# Patient Record
Sex: FEMALE | Race: WHITE | Employment: OTHER | URBAN - METROPOLITAN AREA
[De-identification: names, ages, dates, MRNs, and addresses within clinical notes are randomized per-mention and may not be internally consistent; named-entity substitution may affect disease eponyms.]

---

## 2017-06-01 ENCOUNTER — HOSPITAL ENCOUNTER (OUTPATIENT)
Dept: OTHER | Age: 77
Discharge: HOME | End: 2017-06-01
Attending: INTERNAL MEDICINE | Admitting: INTERNAL MEDICINE

## 2017-06-01 DIAGNOSIS — M54.9 BACK PAIN, UNSPECIFIED BACK LOCATION, UNSPECIFIED BACK PAIN LATERALITY, UNSPECIFIED CHRONICITY: ICD-10-CM

## 2017-09-11 ENCOUNTER — HOSPITAL ENCOUNTER (OUTPATIENT)
Dept: OTHER | Age: 77
Discharge: HOME | End: 2017-09-11
Attending: INTERNAL MEDICINE | Admitting: INTERNAL MEDICINE

## 2017-09-11 DIAGNOSIS — R19.7 DIARRHEA, UNSPECIFIED TYPE: ICD-10-CM

## 2017-09-11 LAB
A/G RATIO: 1.2 (ref 1.1–2.2)
ALBUMIN SERPL-MCNC: 3.8 G/DL (ref 3.4–5)
ALP BLD-CCNC: 94 U/L (ref 40–129)
ALT SERPL-CCNC: 18 U/L (ref 10–40)
ANION GAP SERPL CALCULATED.3IONS-SCNC: 16 MMOL/L (ref 3–16)
AST SERPL-CCNC: 20 U/L (ref 15–37)
BILIRUB SERPL-MCNC: 0.3 MG/DL (ref 0–1)
BUN BLDV-MCNC: 10 MG/DL (ref 7–20)
C DIFFICILE TOXIN, EIA: NORMAL
CALCIUM SERPL-MCNC: 9.9 MG/DL (ref 8.3–10.6)
CHLORIDE BLD-SCNC: 99 MMOL/L (ref 99–110)
CO2: 24 MMOL/L (ref 21–32)
CREAT SERPL-MCNC: 0.8 MG/DL (ref 0.6–1.2)
GFR AFRICAN AMERICAN: >60
GFR NON-AFRICAN AMERICAN: >60
GLOBULIN: 3.1 G/DL
GLUCOSE BLD-MCNC: 124 MG/DL (ref 70–99)
HCT VFR BLD CALC: 44.7 % (ref 36–48)
HEMOGLOBIN: 15.1 G/DL (ref 12–16)
MCH RBC QN AUTO: 31.7 PG (ref 26–34)
MCHC RBC AUTO-ENTMCNC: 33.8 G/DL (ref 31–36)
MCV RBC AUTO: 93.9 FL (ref 80–100)
PDW BLD-RTO: 13.4 % (ref 12.4–15.4)
PLATELET # BLD: 245 K/UL (ref 135–450)
PMV BLD AUTO: 8.7 FL (ref 5–10.5)
POTASSIUM SERPL-SCNC: 4.1 MMOL/L (ref 3.5–5.1)
RBC # BLD: 4.76 M/UL (ref 4–5.2)
SODIUM BLD-SCNC: 139 MMOL/L (ref 136–145)
TOTAL PROTEIN: 6.9 G/DL (ref 6.4–8.2)
WBC # BLD: 13.7 K/UL (ref 4–11)
WHITE BLOOD CELLS (WBC), STOOL: NORMAL

## 2017-09-12 LAB — GI BACTERIAL PATHOGENS BY PCR: NORMAL

## 2017-09-14 LAB
OVA AND PARASITE TRICHROME: NEGATIVE
OVA AND PARASITE WET MOUNT: NEGATIVE

## 2018-06-13 ENCOUNTER — OFFICE VISIT (OUTPATIENT)
Dept: PULMONOLOGY | Age: 78
End: 2018-06-13

## 2018-06-13 VITALS
DIASTOLIC BLOOD PRESSURE: 63 MMHG | TEMPERATURE: 97.8 F | BODY MASS INDEX: 33.38 KG/M2 | WEIGHT: 188.4 LBS | RESPIRATION RATE: 16 BRPM | HEART RATE: 72 BPM | HEIGHT: 63 IN | OXYGEN SATURATION: 94 % | SYSTOLIC BLOOD PRESSURE: 124 MMHG

## 2018-06-13 DIAGNOSIS — Z72.0 TOBACCO USE: ICD-10-CM

## 2018-06-13 DIAGNOSIS — R06.02 SOB (SHORTNESS OF BREATH): Primary | ICD-10-CM

## 2018-06-13 PROCEDURE — 99205 OFFICE O/P NEW HI 60 MIN: CPT | Performed by: INTERNAL MEDICINE

## 2018-06-14 ENCOUNTER — HOSPITAL ENCOUNTER (OUTPATIENT)
Dept: OTHER | Age: 78
Discharge: HOME | End: 2018-06-30
Attending: INTERNAL MEDICINE | Admitting: INTERNAL MEDICINE

## 2018-06-14 ASSESSMENT — PAIN SCALES - QUEBEC BACK PAIN DISABILITY SCALE
SIT IN A CHAIR FOR SEVERAL HOURS: 2
RIDE IN A CAR: 1
CLIMB ONE FLIGHT OF STAIRS: 2
SLEEP THROUGH THE NIGHT: 5
BEND OVER TO CLEAN THE BATHTUB: 2
WALK A FEW BLOCKS OR 300 TO 400M: 5
PUT ON SOCKS OR PANYHOSE: 0
RUN ONE BLOCK OR 100M: 5
TURN OVER IN BED: 3
WALK SEVERAL KILOMETERS  OR MILES: 5
MAKE YOUR BED: 0
STAND UP FOR 20 TO 30 MINUTES: 5
PULL OR PUSH HEAVY DOORS: 1
TOTAL SCORE: 42
QUEBEC DISABILITY INDEX: 40-59%
LIFT AND CARRY A HEAVY SUITCASE: 3
MOVE A CHAIR: 0
CARRY TWO BAGS OF GROCERIES: 1
TAKE FOOD OUT OF THE REFRIGERATOR: 0
THROW A BALL: 0
REACH UP TO HIGH SHELVES: 0
QUEBEC CMS MODIFIER: CK
GET OUT OF BED: 2

## 2018-06-14 NOTE — FLOWSHEET NOTE
with R THR   DKC  x 10 sec     LTR X 10     PPT  TA X 10   add Min cues    pirif   Gentle with R THR    add set  GS     HL TB abd               MHP with Premods 2 large pads B LB seated x 15 min       Other Therapeutic Activities:  Pt was educated on PT POC, Diagnosis, Prognosis, pathomechanics as well as frequency and duration of scheduling future physical therapy appointments. Time was also taken on this day to answer all patient questions and participation in PT. Reviewed appointment policy in detail with patient and patient verbalized understanding. ADL booklet issued and reviewed. Home Exercise Program:  Patient instructed in the above for HEP:   Patient verbalized/demonstrated understanding and was issued written handout.     Timed Code Treatment Minutes:  30    Total Treatment Minutes:  60    Treatment/Activity Tolerance:  [x] Patient tolerated treatment well [] Patient limited by fatigue  [] Patient limited by pain  [] Patient limited by other medical complications  [] Other:     Prognosis: [x] Good [] Fair  [] Poor    Goals:    Short term goals  Time Frame for Short term goals: 2 wks   Short term goal 1: pain improvement 20-30% overall per pt   Short term goal 2: B LE flex WFL      Long term goals  Time Frame for Long term goals : 4 wks  Long term goal 1: pain improvement 40-50% improvement overall per pt for ease with stand/amb justo   Long term goal 2: lumbar ROM WFL min-no c/o for ease with dressing/general mobility   Long term goal 3: pt independent with hep for core stretching/strengthening for ease with light housework      Patient Requires Follow-up: [x] Yes  [] No    Plan:   [] Continue per plan of care [] Alter current plan (see comments)  [x] Plan of care initiated [] Hold pending MD visit [] Discharge    Plan for Next Session:  Add above as stated    Electronically signed by:  Kamini Smith, [id number] [address]

## 2018-06-14 NOTE — PLAN OF CARE
Outpatient Physical Therapy  [x] [facility]    Phone: [phone]   Fax: [phone]   [] [facility]  Phone: [phone]              Fax: [phone]  [] [facility]   Phone: [phone]   Fax: [phone]     To: Referring Practitioner: Dr. Maryann Choe       Patient: Seda Miguel   : 1940   MRN: [id number]  Evaluation Date: 2018      Diagnosis Information:  · Diagnosis: pain in joint multiple   · Treatment Diagnosis: low back pain, dec ROM, weakness     Physical Therapy Certification/Re-Certification Form  Dear Dr. Maryann Choe,   The following patient has been evaluated for physical therapy services and for therapy to continue, Medicare requires monthly physician review of the treatment plan. Please review the attached evaluation and/or summary of the patient's plan of care, and verify that you agree therapy should continue by signing the attached document and sending it back to our office. Plan of Care/Treatment to date:  [x] Therapeutic Exercise    [x] Modalities:  [x] Therapeutic Activity     [x] Ultrasound  [x] Electrical Stimulation  [] Gait Training      [] Cervical Traction [x] Lumbar Traction  [] Neuromuscular Re-education    [x] Cold/hotpack [] Iontophoresis   [x] Instruction in HEP     Other:  [x] Manual Therapy      []             [] Aquatic Therapy      []           ? Frequency/Duration:  # Days per week: [] 1 day # Weeks: [] 1 week [] 5 weeks     [x] 2 days? [] 2 weeks [] 6 weeks     [] 3 days   [] 3 weeks [] 7 weeks     [] 4 days   [x] 4 weeks [] 8 weeks    Rehab Potential: [] Excellent [x] Good [] Fair  [] Poor       Electronically signed by:  Willis Overton, [id number] [address]    If you have any questions or concerns, please don't hesitate to call.   Thank you for your referral.      Physician Signature:________________________________Date:__________________  By signing above, therapists plan is approved by physician

## 2018-06-19 ENCOUNTER — HOSPITAL ENCOUNTER (OUTPATIENT)
Dept: PHYSICAL THERAPY | Age: 78
Discharge: HOME | End: 2018-06-20
Admitting: INTERNAL MEDICINE

## 2018-06-21 ENCOUNTER — HOSPITAL ENCOUNTER (OUTPATIENT)
Dept: PHYSICAL THERAPY | Age: 78
Discharge: HOME | End: 2018-06-22
Admitting: INTERNAL MEDICINE

## 2018-06-21 NOTE — FLOWSHEET NOTE
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 Fontanez Russian of care signed (Y/N): faxed at eval      Visit# / total visits:  3 - G code at 10  Pain level: 0/10     G-Code (if applicable):      Date / Visit # G-Code Applied:  /  PT G-Codes  Functional Assessment Tool Used: [place]  Score: 42/40-59%  Functional Limitation: Changing and maintaining body position  Changing and Maintaining Body Position Current Status (): At least 40 percent but less than 60 percent impaired, limited or restricted  Changing and Maintaining Body Position Goal Status (): At least 20 percent but less than 40 percent impaired, limited or restricted    Progress Note: []  Yes  [x]  No  Next due by: Visit #10      History of Injury: Subjective  Subjective: Pt with HLD in  and a gradual progression of pain every since with a worsening over the past year. Pain is L side of low back and stays there. HARLEEN in  back when it originated with relief, no recent testing. Pain up to 4/10. Pt reports can't be on her feet for 5 min without pain increasing. If she sits or lies down the pain will decrease. Biofreeze helps. Subjective:   I don't have any pain right now but was too sore yesterday to do my HEP.   Objective:  Observation:   Test measurements:      Exercises:  Exercise/Equipment Resistance/Repetitions Other comments No    Plan:   [] Continue per plan of care [] Alter current plan (see comments)  [x] Plan of care initiated [] Hold pending MD visit [] Discharge    Plan for Next Session:   Progress slowly as tolerated; pain seems to flare up easily; Discuss with PT potential change to aquatics as pt IS covered by insurance if needed.     Electronically signed by:  Barrington Persaud

## 2018-06-25 ENCOUNTER — HOSPITAL ENCOUNTER (OUTPATIENT)
Dept: PULMONOLOGY | Age: 78
Discharge: HOME | End: 2018-06-25
Attending: INTERNAL MEDICINE | Admitting: INTERNAL MEDICINE

## 2018-06-25 DIAGNOSIS — R06.02 SOB (SHORTNESS OF BREATH): ICD-10-CM

## 2018-06-25 DIAGNOSIS — R06.02 SHORTNESS OF BREATH: ICD-10-CM

## 2018-06-25 PROCEDURE — 94726 PLETHYSMOGRAPHY LUNG VOLUMES: CPT | Performed by: INTERNAL MEDICINE

## 2018-06-25 PROCEDURE — 94729 DIFFUSING CAPACITY: CPT | Performed by: INTERNAL MEDICINE

## 2018-06-25 PROCEDURE — 94060 EVALUATION OF WHEEZING: CPT | Performed by: INTERNAL MEDICINE

## 2018-06-25 RX ORDER — ALBUTEROL SULFATE 90 UG/1
4 AEROSOL, METERED RESPIRATORY (INHALATION) ONCE
Status: COMPLETED | OUTPATIENT
Start: 2018-06-25 | End: 2018-06-25

## 2018-06-25 RX ADMIN — ALBUTEROL SULFATE 4 PUFF: 90 AEROSOL, METERED RESPIRATORY (INHALATION) at 09:26

## 2018-06-26 ENCOUNTER — HOSPITAL ENCOUNTER (OUTPATIENT)
Dept: PHYSICAL THERAPY | Age: 78
Discharge: HOME | End: 2018-06-27
Admitting: INTERNAL MEDICINE

## 2018-06-28 ENCOUNTER — HOSPITAL ENCOUNTER (OUTPATIENT)
Dept: PHYSICAL THERAPY | Age: 78
Discharge: HOME | End: 2018-06-29
Admitting: INTERNAL MEDICINE

## 2018-07-01 ENCOUNTER — HOSPITAL ENCOUNTER (OUTPATIENT)
Dept: OTHER | Age: 78
Discharge: HOME | End: 2018-07-31
Attending: INTERNAL MEDICINE | Admitting: INTERNAL MEDICINE

## 2018-07-02 RX ORDER — FLUTICASONE FUROATE AND VILANTEROL 100; 25 UG/1; UG/1
1 POWDER RESPIRATORY (INHALATION) DAILY
Qty: 1 EACH | Refills: 5 | Status: SHIPPED | OUTPATIENT
Start: 2018-07-02 | End: 2018-12-27 | Stop reason: SDUPTHER

## 2018-07-02 RX ORDER — ALBUTEROL SULFATE 90 UG/1
2 AEROSOL, METERED RESPIRATORY (INHALATION) EVERY 4 HOURS PRN
Qty: 1 INHALER | Refills: 3 | Status: SHIPPED | OUTPATIENT
Start: 2018-07-02 | End: 2020-05-18

## 2018-07-05 ENCOUNTER — HOSPITAL ENCOUNTER (OUTPATIENT)
Dept: PHYSICAL THERAPY | Age: 78
Discharge: HOME | End: 2018-07-06
Admitting: INTERNAL MEDICINE

## 2018-07-10 ENCOUNTER — HOSPITAL ENCOUNTER (OUTPATIENT)
Dept: PHYSICAL THERAPY | Age: 78
Discharge: HOME | End: 2018-07-11
Admitting: INTERNAL MEDICINE

## 2018-07-10 NOTE — FLOWSHEET NOTE
Physical Therapy Aquatic Flow Sheet   Date:  7/10/2018    Patient Name:  Mich Ferrera    :  1940    Restrictions/Precautions:    Pertinent Medical History: DM, R THR with nerve damage and weakness/ sensory loss     Diagnosis:  pain in joint multiple  Treatment Diagnosis:  low back pain, dec ROM, weakness    Insurance/Certification information: [facility] - $ 40 copay; Referring Physician: Dr. Juaquin Velasquez of care signed (Y/N):      Visit# / total visits:    Pain level: 2/10       G-Code (if applicable):      Date G-Code Applied:        Progress Note: []  Yes  [x]  No  Next due by: Visit #10:      Subjective:  I have been able to iron longer without inc pain.   Objective:  Observation:  See eval  Test measurements:      Key  B= Belt DB= Dumbells T= Theratube   G=Gloves N= Noodles W= Weights   P= Paddles WW=Water Walker K= Kickboard        Transfers:   steps with bilat handrails, step to step      % Immersion: 75%            Ambulation:   Exercises UE:      Forwards 4+1 with SBA Shoulder Shrugs     Lateral  Shoulder circles 10    Marching    Scapular Retraction  10    Retro   Rolling  10    Cariocas  Push Downs 10     IR/ER 10     Punching 10   Stretching: bilat Rowing 10   Gastroc/Soleus  20 sec x 3 R/L Elbow Flex/Ext 10   Hamstring  20 sec x 2 R/L Shldr Flex/Ext  10   Knee flex stretch   Shldr Abd/Add 10   Piriformis   Horiz Abd/Add  10   Hip flexor    Arm Circles  10   SKTC   PNF Diagonals    DKTC  UE oscillations f/b, s/s    ITB   Wall Push-ups    Quad  Figure 8's    Mid back   Buoy pull/push downs    UT  Tband rows    Rhom  Tband lats    Post Shoulder  Lumbar Rot w/ paddles    Pec   Small ball pull downs                   diagonals             Cervical:       AROM Flex       AROM Extension     LEs exercises:  bilat AROM Side Bending    Marching  10 AROM Rotation    Heel Raises  10 Chin tucks    Toe Raises  10 Chin nods     Squats  10      Hamstring Curls  10      Hip Flexion  10 Balance:

## 2018-07-12 ENCOUNTER — HOSPITAL ENCOUNTER (OUTPATIENT)
Dept: PHYSICAL THERAPY | Age: 78
Discharge: HOME | End: 2018-07-13
Admitting: INTERNAL MEDICINE

## 2018-07-17 ENCOUNTER — HOSPITAL ENCOUNTER (OUTPATIENT)
Dept: PHYSICAL THERAPY | Age: 78
Discharge: HOME | End: 2018-07-18
Admitting: INTERNAL MEDICINE

## 2018-07-17 ASSESSMENT — PAIN SCALES - QUEBEC BACK PAIN DISABILITY SCALE
TOTAL SCORE: 35
SIT IN A CHAIR FOR SEVERAL HOURS: 2
PULL OR PUSH HEAVY DOORS: 1
STAND UP FOR 20 TO 30 MINUTES: 5
LIFT AND CARRY A HEAVY SUITCASE: 3
MAKE YOUR BED: 0
QUEBEC DISABILITY INDEX: 20-39%
CARRY TWO BAGS OF GROCERIES: 1
RIDE IN A CAR: 1
GET OUT OF BED: 1
TAKE FOOD OUT OF THE REFRIGERATOR: 0
WALK SEVERAL KILOMETERS  OR MILES: 5
WALK A FEW BLOCKS OR 300 TO 400M: 5
RUN ONE BLOCK OR 100M: 5
SLEEP THROUGH THE NIGHT: 2
TURN OVER IN BED: 1
MOVE A CHAIR: 0
PUT ON SOCKS OR PANYHOSE: 0
CLIMB ONE FLIGHT OF STAIRS: 1
QUEBEC CMS MODIFIER: CJ
BEND OVER TO CLEAN THE BATHTUB: 2
THROW A BALL: 0
REACH UP TO HIGH SHELVES: 0

## 2018-07-24 ENCOUNTER — HOSPITAL ENCOUNTER (OUTPATIENT)
Dept: PHYSICAL THERAPY | Age: 78
Discharge: HOME | End: 2018-07-25
Admitting: INTERNAL MEDICINE

## 2018-07-24 ASSESSMENT — PAIN SCALES - QUEBEC BACK PAIN DISABILITY SCALE
GET OUT OF BED: 1
SIT IN A CHAIR FOR SEVERAL HOURS: 2
THROW A BALL: 0
STAND UP FOR 20 TO 30 MINUTES: 5
REACH UP TO HIGH SHELVES: 0
QUEBEC DISABILITY INDEX: 20-39%
WALK A FEW BLOCKS OR 300 TO 400M: 5
TOTAL SCORE: 35
RIDE IN A CAR: 1
CLIMB ONE FLIGHT OF STAIRS: 1
RUN ONE BLOCK OR 100M: 5
MOVE A CHAIR: 0
TURN OVER IN BED: 1
LIFT AND CARRY A HEAVY SUITCASE: 3
BEND OVER TO CLEAN THE BATHTUB: 2
CARRY TWO BAGS OF GROCERIES: 1
WALK SEVERAL KILOMETERS  OR MILES: 5
MAKE YOUR BED: 0
PULL OR PUSH HEAVY DOORS: 1
PUT ON SOCKS OR PANYHOSE: 0
TAKE FOOD OUT OF THE REFRIGERATOR: 0
QUEBEC CMS MODIFIER: CJ
SLEEP THROUGH THE NIGHT: 2

## 2018-07-24 NOTE — DISCHARGE SUMMARY
Discharge:  [x] Goals Met   [] Patient did not return after initial evaluation   [] Progress Plateaued [] Missed _____ scheduled appointments   [] No insurance coverage [] Patient terminated therapy   [] Other:        PT recommendation:   [x] Patient now discharged with HEP. To continue on own with silver sneakers      [] Other:    Discharge discussed with:  [x] Patient  [] Caregiver      [] Other        Electronically signed by:  Nataliia Baumann, [id number] [address]    If you have any questions or concerns, please don't hesitate to call.   Thank you for your referral.

## 2018-08-01 ENCOUNTER — HOSPITAL ENCOUNTER (OUTPATIENT)
Dept: OTHER | Age: 78
Discharge: HOME | End: 2018-08-31
Attending: INTERNAL MEDICINE | Admitting: INTERNAL MEDICINE

## 2018-09-18 ENCOUNTER — OFFICE VISIT (OUTPATIENT)
Dept: PULMONOLOGY | Age: 78
End: 2018-09-18

## 2018-09-18 VITALS
BODY MASS INDEX: 32.88 KG/M2 | RESPIRATION RATE: 16 BRPM | HEIGHT: 64 IN | HEART RATE: 78 BPM | SYSTOLIC BLOOD PRESSURE: 118 MMHG | DIASTOLIC BLOOD PRESSURE: 72 MMHG | TEMPERATURE: 97.9 F | WEIGHT: 192.6 LBS | OXYGEN SATURATION: 95 %

## 2018-09-18 DIAGNOSIS — Z87.891 PERSONAL HISTORY OF TOBACCO USE: ICD-10-CM

## 2018-09-18 DIAGNOSIS — R06.02 SOB (SHORTNESS OF BREATH): Primary | ICD-10-CM

## 2018-09-18 DIAGNOSIS — R06.02 SOB (SHORTNESS OF BREATH): ICD-10-CM

## 2018-09-18 PROCEDURE — 99214 OFFICE O/P EST MOD 30 MIN: CPT | Performed by: INTERNAL MEDICINE

## 2018-09-18 NOTE — PROGRESS NOTES
Chief complaint  This is a 70y.o. year old female  who presents with a chief complaint of   Chief Complaint   Patient presents with    Follow-up     SOB       HPI  77-year-old woman presents for follow up of shortness of breath. She is compliant with Breo daily. She has not had to use the albuterol inhaler. Her activities are limited by chronic hip pain, however, she is able to grocery shop and do light chores around the house. She denies cough or sputum production.     Past Medical History:   Diagnosis Date    Depression     Diabetes ([address]     controlled    GERD (gastroesophageal reflux disease)     HIGH CHOLESTEROL     Hypertension     Nerve damage     sciatic- from hip surgery    Obesity     Osteoarthritis        Past Surgical History:   Procedure Laterality Date    EYE SURGERY      JOINT REPLACEMENT      TONSILLECTOMY      TOTAL HIP ARTHROPLASTY  5/17/10    right       Current Outpatient Prescriptions   Medication Sig Dispense Refill    metFORMIN (GLUCOPHAGE) 500 MG tablet TAKE 1 TABLET BY MOUTH TWICE DAILY WITH MEALS 180 tablet 3    Blood Glucose Monitoring Suppl (ACCU-CHEK COMPACT CARE KIT) KIT As directed 1 kit 0    ACCU-CHEK RADU PLUS strip TEST 3 TIMES DAILY AS NEEDED 200 strip 3    meloxicam (MOBIC) 15 MG tablet TAKE 1 TABLET BY MOUTH DAILY 90 tablet 3    fluticasone-vilanterol (BREO ELLIPTA) 100-25 MCG/INH AEPB inhaler Inhale 1 puff into the lungs daily One puff daily 1 each 5    albuterol sulfate HFA (PROAIR HFA) 108 (90 Base) MCG/ACT inhaler Inhale 2 puffs into the lungs every 4 hours as needed for Wheezing or Shortness of Breath 1 Inhaler 3    furosemide (LASIX) 20 MG tablet TAKE 1 TABLET TWICE DAILY 180 tablet 3    traZODone (DESYREL) 100 MG tablet TAKE 1 TABLET TWICE DAILY 180 tablet 3    lisinopril (PRINIVIL;ZESTRIL) 20 MG tablet TAKE 1 TABLET EVERY DAY 90 tablet 3    omeprazole (PRILOSEC) 20 MG delayed release capsule TAKE 1 CAPSULE EVERY DAY 90 capsule 3    atorvastatin rhythm, no appreciable murmurs. No edema. Gastrointestinal: Soft, non-tender. No hernia  Skin: Warm and dry. No rashes or ulcers on visible areas. Normal texture and turgor  Lymphatic: No cervical LAD. No supraclavicular LAD. Musculoskeletal: No cyanosis, clubbing or joint deformity. Psychiatric: Normal mood and affect; exhibits normal insight and judgement     Data reviewed:  Pulmonary Function Testing (6/25/18)  FVC 1.41 L at 55% predicted ---> 1.7L at 66% predicted  FEV1 1.18 L at 59% predicted ----> 1.43L at 72% predicted  FEV1/FVC ratio at 83% ----> 84% predicted  TLC 4.56 L at 93% predicted  VC 1.61L at 63% predicted  ERV 0.11L at 12% predicted  RV/TLC at 65% predicted  DLCO 17.89 at 78% predicted  DLCO/VA 5.93L at 126 % predicted    Overall: Proportionate reduction in flow measurements with significant reversal; air trapping is present; diffusing capacity is mildly reduced, but normalizes when averaged over lung volumes    Images and reports of chest imaging were reviewed by me.  My interpretation is:  CXR (6/25/18): Clear lung fields    ECHO (6/29/05- [facility])  FINDINGS:      This is a technically adequate study.    The left ventricle is normal in size.  There is normal wall thickness.  Normal regional   and global systolic function.  The estimated ejection fraction is 65%.    The right ventricle and both atria are normal in size with normal contractility.    The cardiac valves are normal delicate structures.        There is no pericardial effusion, intracardiac thrombus or mass identified.    Color Doppler shows normal antegrade flow across all the valves.  There is no significant   stenosis or regurgitation of any valve.  Pulmonary pressures are normal.      Lab Results   Component Value Date    WBC 13.7 (H) 09/11/2017    HGB 15.1 09/11/2017    HCT 44.7 09/11/2017    MCV 93.9 09/11/2017     09/11/2017       No results found for: BNP    Lab Results   Component Value Date    CREATININE 1.0 08/21/2018    BUN 16 08/21/2018     08/21/2018    K 4.3 08/21/2018     08/21/2018    CO2 25 08/21/2018         Assessment/Plan:70y.o. year old female presents for follow up. Shortness of breath- PFTs show evidence of air trapping and reversal in flow measurements. Findings may be due to combination of COPD and asthma. Will obtain allergy testing and IgE. Personal history of tobacco use- Meets criteria for lung cancer screening. Will obtain chest CT. She will return for follow-up in 8 months as she will be in [place] for the next 7 months.       Ottoniel Torrez MD  [place] Pulmonology, Critical Care and Sleep

## 2018-09-21 LAB
ALLERGEN ASPERGILLUS ALTERNATA IGE: 0.46 KU/L
ALLERGEN ASPERGILLUS FUMIGATUS IGE: <0.1 KU/L
ALLERGEN BERMUDA GRASS IGE: <0.1 KU/L
ALLERGEN BIRCH IGE: <0.1 KU/L
ALLERGEN CAT DANDER IGE: <0.1 KU/L
ALLERGEN COMMON SHORT RAGWEED IGE: <0.1 KU/L
ALLERGEN COTTONWOOD: <0.1 KU/L
ALLERGEN COW MILK IGE: 0.35 KU/L
ALLERGEN DOG DANDER IGE: <0.1 KU/L
ALLERGEN ELM IGE: <0.1 KU/L
ALLERGEN FUNGI/MOLD M.RACEMOSUS IGE: 0.1 KU/L
ALLERGEN GERMAN COCKROACH IGE: 0.12 KU/L
ALLERGEN HORMODENDRUM HORDEI IGE: <0.1 KU/L
ALLERGEN MAPLE/BOX ELDER IGE: <0.1 KU/L
ALLERGEN MITE DUST FARINAE IGE: 0.2 KU/L
ALLERGEN MITE DUST PTERONYSSINUS IGE: 0.15 KU/L
ALLERGEN MOUNTAIN CEDAR: <0.1 KU/L
ALLERGEN MOUSE EPITHELIA IGE: <0.1 KU/L
ALLERGEN OAK TREE IGE: <0.1 KU/L
ALLERGEN PEANUT (F13) IGE: <0.1 KU/L
ALLERGEN PECAN TREE IGE: <0.1 KU/L
ALLERGEN PENICILLIUM NOTATUM: <0.1 KU/L
ALLERGEN ROUGH PIGWEED (W14) IGE: <0.1 KU/L
ALLERGEN RUSSIAN THISTLE IGE: <0.1 KU/L
ALLERGEN SEE NOTE: NORMAL
ALLERGEN SHEEP SORREL (W18) IGE: <0.1 KU/L
ALLERGEN TIMOTHY GRASS: <0.1 KU/L
ALLERGEN TREE SYCAMORE: <0.1 KU/L
ALLERGEN WALNUT TREE IGE: <0.1 KU/L
ALLERGEN WHITE MULBERRY TREE, IGE: <0.1 KU/L
ALLERGEN, TREE, WHITE ASH IGE: <0.1 KU/L
IGE: 205 KU/L

## 2018-09-24 ENCOUNTER — HOSPITAL ENCOUNTER (OUTPATIENT)
Dept: CT IMAGING | Age: 78
Discharge: HOME | End: 2018-09-24
Payer: MEDICARE

## 2018-09-24 DIAGNOSIS — Z87.891 PERSONAL HISTORY OF TOBACCO USE: ICD-10-CM

## 2018-09-24 PROCEDURE — G0297 LDCT FOR LUNG CA SCREEN: HCPCS

## 2018-12-27 ENCOUNTER — TELEPHONE (OUTPATIENT)
Dept: PULMONOLOGY | Age: 78
End: 2018-12-27

## 2018-12-27 RX ORDER — FLUTICASONE FUROATE AND VILANTEROL 100; 25 UG/1; UG/1
1 POWDER RESPIRATORY (INHALATION) DAILY
Qty: 1 EACH | Refills: 5 | Status: SHIPPED | OUTPATIENT
Start: 2018-12-27 | End: 2019-03-04 | Stop reason: SDUPTHER

## 2018-12-27 RX ORDER — FLUTICASONE FUROATE AND VILANTEROL 100; 25 UG/1; UG/1
1 POWDER RESPIRATORY (INHALATION) DAILY
Qty: 1 EACH | Refills: 5 | Status: CANCELLED | OUTPATIENT
Start: 2018-12-27

## 2019-05-16 ENCOUNTER — HOSPITAL ENCOUNTER (OUTPATIENT)
Dept: CT IMAGING | Age: 79
Discharge: HOME | End: 2019-05-16
Payer: MEDICARE

## 2019-05-16 DIAGNOSIS — J98.59 MEDIASTINAL MASS: ICD-10-CM

## 2019-05-16 LAB
GFR AFRICAN AMERICAN: 58
GFR NON-AFRICAN AMERICAN: 48
PERFORMED ON: ABNORMAL
POC CREATININE: 1.1 MG/DL (ref 0.6–1.2)
POC SAMPLE TYPE: ABNORMAL

## 2019-05-16 PROCEDURE — 71260 CT THORAX DX C+: CPT

## 2019-05-16 PROCEDURE — 82565 ASSAY OF CREATININE: CPT

## 2019-05-16 PROCEDURE — 6360000004 HC RX CONTRAST MEDICATION: Performed by: INTERNAL MEDICINE

## 2019-05-16 RX ADMIN — IOPAMIDOL 75 ML: 755 INJECTION, SOLUTION INTRAVENOUS at 09:24

## 2019-05-18 ENCOUNTER — HOSPITAL ENCOUNTER (OUTPATIENT)
Age: 79
Discharge: HOME | End: 2019-05-18
Payer: MEDICARE

## 2019-05-18 ENCOUNTER — HOSPITAL ENCOUNTER (OUTPATIENT)
Dept: ULTRASOUND IMAGING | Age: 79
Discharge: HOME | End: 2019-05-18
Payer: MEDICARE

## 2019-05-18 DIAGNOSIS — E04.1 THYROID NODULE: ICD-10-CM

## 2019-05-18 PROCEDURE — 76536 US EXAM OF HEAD AND NECK: CPT

## 2019-05-28 ENCOUNTER — OFFICE VISIT (OUTPATIENT)
Dept: PULMONOLOGY | Age: 79
End: 2019-05-28
Payer: MEDICARE

## 2019-05-28 VITALS
WEIGHT: 181.2 LBS | SYSTOLIC BLOOD PRESSURE: 118 MMHG | OXYGEN SATURATION: 95 % | HEIGHT: 64 IN | RESPIRATION RATE: 16 BRPM | BODY MASS INDEX: 30.93 KG/M2 | HEART RATE: 86 BPM | DIASTOLIC BLOOD PRESSURE: 64 MMHG | TEMPERATURE: 97.6 F

## 2019-05-28 DIAGNOSIS — J44.9 ASTHMA-COPD OVERLAP SYNDROME (HCC): ICD-10-CM

## 2019-05-28 DIAGNOSIS — Z87.891 PERSONAL HISTORY OF TOBACCO USE: ICD-10-CM

## 2019-05-28 DIAGNOSIS — R59.0 MEDIASTINAL ADENOPATHY: Primary | ICD-10-CM

## 2019-05-28 DIAGNOSIS — R93.89 ABNORMAL CHEST CT: ICD-10-CM

## 2019-05-28 PROCEDURE — G8417 CALC BMI ABV UP PARAM F/U: HCPCS | Performed by: INTERNAL MEDICINE

## 2019-05-28 PROCEDURE — 3023F SPIROM DOC REV: CPT | Performed by: INTERNAL MEDICINE

## 2019-05-28 PROCEDURE — 4040F PNEUMOC VAC/ADMIN/RCVD: CPT | Performed by: INTERNAL MEDICINE

## 2019-05-28 PROCEDURE — 1123F ACP DISCUSS/DSCN MKR DOCD: CPT | Performed by: INTERNAL MEDICINE

## 2019-05-28 PROCEDURE — 99214 OFFICE O/P EST MOD 30 MIN: CPT | Performed by: INTERNAL MEDICINE

## 2019-05-28 PROCEDURE — 1036F TOBACCO NON-USER: CPT | Performed by: INTERNAL MEDICINE

## 2019-05-28 PROCEDURE — G8926 SPIRO NO PERF OR DOC: HCPCS | Performed by: INTERNAL MEDICINE

## 2019-05-28 PROCEDURE — 1090F PRES/ABSN URINE INCON ASSESS: CPT | Performed by: INTERNAL MEDICINE

## 2019-05-28 PROCEDURE — G8427 DOCREV CUR MEDS BY ELIG CLIN: HCPCS | Performed by: INTERNAL MEDICINE

## 2019-05-28 PROCEDURE — G8400 PT W/DXA NO RESULTS DOC: HCPCS | Performed by: INTERNAL MEDICINE

## 2019-05-28 NOTE — PROGRESS NOTES
lungs daily One puff daily 3 each 5    omeprazole (PRILOSEC) 20 MG delayed release capsule TAKE 1 CAPSULE EVERY DAY 90 capsule 3    atorvastatin (LIPITOR) 10 MG tablet TAKE 1 TABLET EVERY DAY 90 tablet 3    Blood Glucose Monitoring Suppl (ACCU-CHEK COMPACT CARE KIT) KIT As directed 1 kit 0    furosemide (LASIX) 20 MG tablet TAKE 1 TABLET TWICE DAILY 180 tablet 3    Naproxen Sodium (ALEVE PO) Take  by mouth.  aspirin 81 MG EC tablet Take 81 mg by mouth daily.  calcium carbonate 600 MG TABS tablet Take 1 tablet by mouth daily.  albuterol sulfate HFA (PROAIR HFA) 108 (90 Base) MCG/ACT inhaler Inhale 2 puffs into the lungs every 4 hours as needed for Wheezing or Shortness of Breath 1 Inhaler 3     No current facility-administered medications for this visit. Allergies   Allergen Reactions    Aspirin      Can take low dose       Family History   Problem Relation Age of Onset    Hypertension Mother     Asthma Sister        Social History     Socioeconomic History    Marital status:      Spouse name: Not on file    Number of children: Not on file    Years of education: Not on file    Highest education level: Not on file   Occupational History    Not on file   Social Needs    Financial resource strain: Not on file    Food insecurity:     Worry: Not on file     Inability: Not on file    Transportation needs:     Medical: Not on file     Non-medical: Not on file   Tobacco Use    Smoking status: Former Smoker     Packs/day: 0.25     Years: 45.00     Pack years: 11.25     Types: Cigarettes     Start date: 1953     Last attempt to quit: 2018     Years since quittin.8    Smokeless tobacco: Never Used    Tobacco comment: did smoke 1 pk per day   Substance and Sexual Activity    Alcohol use:  Yes     Alcohol/week: 1.0 - 2.0 oz     Types: 2 - 4 Standard drinks or equivalent per week    Drug use: No    Sexual activity: Not on file   Lifestyle    Physical activity: Days per week: Not on file     Minutes per session: Not on file    Stress: Not on file   Relationships    Social connections:     Talks on phone: Not on file     Gets together: Not on file     Attends Latter day service: Not on file     Active member of club or organization: Not on file     Attends meetings of clubs or organizations: Not on file     Relationship status: Not on file    Intimate partner violence:     Fear of current or ex partner: Not on file     Emotionally abused: Not on file     Physically abused: Not on file     Forced sexual activity: Not on file   Other Topics Concern    Not on file   Social History Narrative    Not on file   Used to smoke one pack per day for about 50 years. Quit a few months ago    Immunization History   Administered Date(s) Administered    Influenza, MDCK Quadv, with preserv IM (Flucelvax 4 yrs and older) 10/09/2017       ROS:  GENERAL:  No fevers, no chills, +11lb weight loss in 8 months  RESPIRATORY:  No shortness of breath, no wheezing, no cough      PHYSICAL EXAM:  Vitals:    05/28/19 1344   BP: 118/64   Pulse: 86   Resp: 16   Temp: 97.6 °F (36.4 °C)   SpO2: 95%   on RA    Gen: Well developed; well nourished  Eyes: No scleral icterus. No conjunctival injection. ENT:  Oropharynx clear. External appearance of ears and nose normal.  Neck: Trachea midline. No obvious mass. No visible thyroid enlargement    Respiratory: Clear to auscultation bilaterally, no accessory muscle use  Cardiovascular: Regular rate and rhythm, no appreciable murmurs. No edema. Gastrointestinal: Soft, non-tender. No hernia  Skin: Warm and dry. No rashes or ulcers on visible areas. Normal texture and turgor  Lymphatic: No cervical LAD. No supraclavicular LAD. Musculoskeletal: No cyanosis, clubbing or joint deformity.     Psychiatric: Normal mood and affect; exhibits normal insight and judgement     Data reviewed:  Pulmonary Function Testing (6/25/18)  FVC 1.41 L at 55% predicted ---> 1.7L at Component Value Date    CREATININE 1.1 05/16/2019    BUN 16 08/21/2018     08/21/2018    K 4.3 08/21/2018     08/21/2018    CO2 25 08/21/2018         Assessment/Plan:70y.o. year old female presents for follow up. Asthma/COPD overlap- She will continue Breo daily. Continue albuterol inhaler as needed. Mediastinal adenopathy- She has what appears to be an enlarged epicardial lymph node. Will perform surveillance and repeat chest CT in September 2019 (~1 year follow up). Abnormal chest CT- She has foci of ground glass opacities in the right upper lobe and right lower lobe which likely represent resolving pneumonia. Will re-evaluate with repeat chest CT as above. Personal history of tobacco use- She meets criteria for lung cancer screening, however, will obtain chest CT as above. She will return for follow-up in 4 months.        Katty Hollins MD  [facility] Pulmonology, Critical Care and Sleep

## 2019-05-30 ENCOUNTER — HOSPITAL ENCOUNTER (OUTPATIENT)
Dept: ULTRASOUND IMAGING | Age: 79
Discharge: HOME | End: 2019-05-30
Payer: MEDICARE

## 2019-05-30 DIAGNOSIS — E04.1 THYROID NODULE: ICD-10-CM

## 2019-05-30 PROCEDURE — 88305 TISSUE EXAM BY PATHOLOGIST: CPT

## 2019-05-30 PROCEDURE — 88173 CYTOPATH EVAL FNA REPORT: CPT

## 2019-05-30 PROCEDURE — 60100 BIOPSY OF THYROID: CPT

## 2019-06-20 ENCOUNTER — HOSPITAL ENCOUNTER (OUTPATIENT)
Dept: VASCULAR LAB | Age: 79
Discharge: HOME | End: 2019-06-20
Payer: MEDICARE

## 2019-06-20 ENCOUNTER — HOSPITAL ENCOUNTER (OUTPATIENT)
Age: 79
Discharge: HOME | End: 2019-06-20
Payer: MEDICARE

## 2019-06-20 ENCOUNTER — HOSPITAL ENCOUNTER (OUTPATIENT)
Dept: GENERAL RADIOLOGY | Age: 79
Discharge: HOME | End: 2019-06-20
Payer: MEDICARE

## 2019-06-20 ENCOUNTER — HOSPITAL ENCOUNTER (OUTPATIENT)
Dept: WOMENS IMAGING | Age: 79
Discharge: HOME | End: 2019-06-20
Payer: MEDICARE

## 2019-06-20 DIAGNOSIS — M79.672 PAIN IN BOTH FEET: ICD-10-CM

## 2019-06-20 DIAGNOSIS — M79.671 PAIN IN BOTH FEET: ICD-10-CM

## 2019-06-20 DIAGNOSIS — Z78.0 POST-MENOPAUSAL: ICD-10-CM

## 2019-06-20 PROCEDURE — 93925 LOWER EXTREMITY STUDY: CPT

## 2019-06-20 PROCEDURE — 73630 X-RAY EXAM OF FOOT: CPT

## 2019-06-20 PROCEDURE — 77080 DXA BONE DENSITY AXIAL: CPT

## 2019-06-20 PROCEDURE — 73610 X-RAY EXAM OF ANKLE: CPT

## 2019-09-26 ENCOUNTER — TELEPHONE (OUTPATIENT)
Dept: PULMONOLOGY | Age: 79
End: 2019-09-26

## 2019-09-30 ENCOUNTER — HOSPITAL ENCOUNTER (OUTPATIENT)
Dept: CT IMAGING | Age: 79
Discharge: HOME | End: 2019-09-30
Payer: MEDICARE

## 2019-09-30 DIAGNOSIS — R59.0 MEDIASTINAL ADENOPATHY: ICD-10-CM

## 2019-09-30 PROCEDURE — 71250 CT THORAX DX C-: CPT

## 2019-10-07 ENCOUNTER — OFFICE VISIT (OUTPATIENT)
Dept: PULMONOLOGY | Age: 79
End: 2019-10-07
Payer: MEDICARE

## 2019-10-07 VITALS
WEIGHT: 182 LBS | TEMPERATURE: 97.6 F | RESPIRATION RATE: 14 BRPM | HEART RATE: 73 BPM | BODY MASS INDEX: 31.07 KG/M2 | DIASTOLIC BLOOD PRESSURE: 76 MMHG | HEIGHT: 64 IN | SYSTOLIC BLOOD PRESSURE: 124 MMHG | OXYGEN SATURATION: 93 %

## 2019-10-07 DIAGNOSIS — J44.9 ASTHMA-COPD OVERLAP SYNDROME (HCC): ICD-10-CM

## 2019-10-07 DIAGNOSIS — R59.0 MEDIASTINAL ADENOPATHY: ICD-10-CM

## 2019-10-07 DIAGNOSIS — Z87.891 PERSONAL HISTORY OF TOBACCO USE: ICD-10-CM

## 2019-10-07 DIAGNOSIS — R93.89 ABNORMAL CHEST CT: Primary | ICD-10-CM

## 2019-10-07 PROCEDURE — 4040F PNEUMOC VAC/ADMIN/RCVD: CPT | Performed by: INTERNAL MEDICINE

## 2019-10-07 PROCEDURE — 3023F SPIROM DOC REV: CPT | Performed by: INTERNAL MEDICINE

## 2019-10-07 PROCEDURE — G8926 SPIRO NO PERF OR DOC: HCPCS | Performed by: INTERNAL MEDICINE

## 2019-10-07 PROCEDURE — 1090F PRES/ABSN URINE INCON ASSESS: CPT | Performed by: INTERNAL MEDICINE

## 2019-10-07 PROCEDURE — 1036F TOBACCO NON-USER: CPT | Performed by: INTERNAL MEDICINE

## 2019-10-07 PROCEDURE — G8417 CALC BMI ABV UP PARAM F/U: HCPCS | Performed by: INTERNAL MEDICINE

## 2019-10-07 PROCEDURE — 99214 OFFICE O/P EST MOD 30 MIN: CPT | Performed by: INTERNAL MEDICINE

## 2019-10-07 PROCEDURE — G8427 DOCREV CUR MEDS BY ELIG CLIN: HCPCS | Performed by: INTERNAL MEDICINE

## 2019-10-07 PROCEDURE — G8399 PT W/DXA RESULTS DOCUMENT: HCPCS | Performed by: INTERNAL MEDICINE

## 2019-10-07 PROCEDURE — G8482 FLU IMMUNIZE ORDER/ADMIN: HCPCS | Performed by: INTERNAL MEDICINE

## 2019-10-07 PROCEDURE — 1123F ACP DISCUSS/DSCN MKR DOCD: CPT | Performed by: INTERNAL MEDICINE

## 2019-10-07 RX ORDER — ALBUTEROL SULFATE 90 UG/1
2 AEROSOL, METERED RESPIRATORY (INHALATION) EVERY 4 HOURS PRN
Qty: 1 INHALER | Refills: 2 | Status: SHIPPED | OUTPATIENT
Start: 2019-10-07 | End: 2021-02-08

## 2020-05-20 ENCOUNTER — HOSPITAL ENCOUNTER (OUTPATIENT)
Age: 80
Discharge: HOME | End: 2020-05-20
Payer: MEDICARE

## 2020-05-20 ENCOUNTER — HOSPITAL ENCOUNTER (OUTPATIENT)
Dept: CT IMAGING | Age: 80
Discharge: HOME | End: 2020-05-20
Payer: MEDICARE

## 2020-05-20 PROCEDURE — 71250 CT THORAX DX C-: CPT

## 2020-07-23 ENCOUNTER — OFFICE VISIT (OUTPATIENT)
Dept: PULMONOLOGY | Age: 80
End: 2020-07-23
Payer: MEDICARE

## 2020-07-23 VITALS
HEART RATE: 83 BPM | WEIGHT: 194 LBS | HEIGHT: 64 IN | BODY MASS INDEX: 33.12 KG/M2 | TEMPERATURE: 98.1 F | RESPIRATION RATE: 16 BRPM | SYSTOLIC BLOOD PRESSURE: 140 MMHG | DIASTOLIC BLOOD PRESSURE: 78 MMHG | OXYGEN SATURATION: 93 %

## 2020-07-23 PROCEDURE — 4040F PNEUMOC VAC/ADMIN/RCVD: CPT | Performed by: INTERNAL MEDICINE

## 2020-07-23 PROCEDURE — 1090F PRES/ABSN URINE INCON ASSESS: CPT | Performed by: INTERNAL MEDICINE

## 2020-07-23 PROCEDURE — G8926 SPIRO NO PERF OR DOC: HCPCS | Performed by: INTERNAL MEDICINE

## 2020-07-23 PROCEDURE — 3023F SPIROM DOC REV: CPT | Performed by: INTERNAL MEDICINE

## 2020-07-23 PROCEDURE — 1123F ACP DISCUSS/DSCN MKR DOCD: CPT | Performed by: INTERNAL MEDICINE

## 2020-07-23 PROCEDURE — G8427 DOCREV CUR MEDS BY ELIG CLIN: HCPCS | Performed by: INTERNAL MEDICINE

## 2020-07-23 PROCEDURE — 99213 OFFICE O/P EST LOW 20 MIN: CPT | Performed by: INTERNAL MEDICINE

## 2020-07-23 PROCEDURE — 1036F TOBACCO NON-USER: CPT | Performed by: INTERNAL MEDICINE

## 2020-07-23 PROCEDURE — G8399 PT W/DXA RESULTS DOCUMENT: HCPCS | Performed by: INTERNAL MEDICINE

## 2020-07-23 PROCEDURE — G8417 CALC BMI ABV UP PARAM F/U: HCPCS | Performed by: INTERNAL MEDICINE

## 2020-07-23 ASSESSMENT — ASTHMA QUESTIONNAIRES
ACT_TOTALSCORE: 25
QUESTION_2 LAST FOUR WEEKS HOW OFTEN HAVE YOU HAD SHORTNESS OF BREATH: 5
QUESTION_5 LAST FOUR WEEKS HOW WOULD YOU RATE YOUR ASTHMA CONTROL: 5
QUESTION_3 LAST FOUR WEEKS HOW OFTEN DID YOUR ASTHMA SYMPTOMS (WHEEZING, COUGHING, SHORTNESS OF BREATH, CHEST TIGHTNESS OR PAIN) WAKE YOU UP AT NIGHT OR EARLIER THAN USUAL IN THE MORNING: 5
QUESTION_1 LAST FOUR WEEKS HOW MUCH OF THE TIME DID YOUR ASTHMA KEEP YOU FROM GETTING AS MUCH DONE AT WORK, SCHOOL OR AT HOME: 5
QUESTION_4 LAST FOUR WEEKS HOW OFTEN HAVE YOU USED YOUR RESCUE INHALER OR NEBULIZER MEDICATION (SUCH AS ALBUTEROL): 5

## 2020-07-23 NOTE — PROGRESS NOTES
Chief complaint  This is a 68y.o. year old female  who presents with a chief complaint of   Chief Complaint   Patient presents with    Follow-up     6m f/u asthma copd       HPI  79-year-old woman with asthma/COPD overlap, mediastinal adenopathy, lung nodules and personal history of tobacco use presents for follow up. She states she stopped using Breo several months ago. She is not using albuterol either. She denies shortness of breath. She is able to go grocery shopping without dyspnea. She denies cough or sputum production. She denies nighttime awakenings due to cough or shortness of breath. Past Medical History:   Diagnosis Date    Depression     Diabetes ([address]     controlled    GERD (gastroesophageal reflux disease)     HIGH CHOLESTEROL     Hypertension     Nerve damage     sciatic- from hip surgery    Obesity     Osteoarthritis        Past Surgical History:   Procedure Laterality Date    EYE SURGERY      JOINT REPLACEMENT      TONSILLECTOMY      TOTAL HIP ARTHROPLASTY  5/17/10    right       Current Outpatient Medications   Medication Sig Dispense Refill    traMADol (ULTRAM) 50 MG tablet Take 2 tablets by mouth every 6 hours as needed for Pain for up to 90 days.  720 tablet 0    omeprazole (PRILOSEC) 20 MG delayed release capsule TAKE 1 CAPSULE EVERY DAY 90 capsule 3    atorvastatin (LIPITOR) 10 MG tablet TAKE 1 TABLET EVERY DAY 90 tablet 3    metFORMIN (GLUCOPHAGE) 500 MG tablet TAKE 1 TABLET BY MOUTH TWICE DAILY WITH MEALS 180 tablet 3    lisinopril (PRINIVIL;ZESTRIL) 20 MG tablet TAKE 1 TABLET EVERY DAY 90 tablet 3    blood glucose test strips (ACCU-CHEK RADU PLUS) strip TEST THREE TIMES DAILY AS NEEDED 300 strip 2    albuterol sulfate HFA (VENTOLIN HFA) 108 (90 Base) MCG/ACT inhaler Inhale 2 puffs into the lungs every 4 hours as needed for Wheezing or Shortness of Breath 1 Inhaler 2    fluticasone-vilanterol (BREO ELLIPTA) 100-25 MCG/INH AEPB inhaler Inhale 1 puff into the predicted  TLC 4.56 L at 93% predicted  VC 1.61L at 63% predicted  ERV 0.11L at 12% predicted  RV/TLC at 65% predicted  DLCO 17.89 at 78% predicted  DLCO/VA 5.93L at 126 % predicted    Overall: Proportionate reduction in flow measurements with significant reversal; air trapping is present; diffusing capacity is mildly reduced, but normalizes when averaged over lung volumes    Images and reports of chest imaging were reviewed by me. My interpretation is:  CXR (6/25/18): Clear lung fields  Chest CT (5/20/20): Presumed epicardial node (unchanged compared to September 2018); granuloma in the right apex; faint ground glass opacities in the right upper lobe and right lower lobe (unchanged compared to September 2018); right lower lobe nodule (unchanged compared to September 2018); atelectasis in the lingula      ECHO (6/29/05- [facility] records)  FINDINGS:      This is a technically adequate study.    The left ventricle is normal in size.  There is normal wall thickness.  Normal regional   and global systolic function.  The estimated ejection fraction is 65%.    The right ventricle and both atria are normal in size with normal contractility.    The cardiac valves are normal delicate structures.        There is no pericardial effusion, intracardiac thrombus or mass identified.    Color Doppler shows normal antegrade flow across all the valves.  There is no significant   stenosis or regurgitation of any valve.  Pulmonary pressures are normal.      Lab Results   Component Value Date    WBC 6.3 05/18/2020    HGB 13.0 05/18/2020    HCT 39.3 05/18/2020    MCV 94.2 05/18/2020     05/18/2020       No results found for: BNP    Lab Results   Component Value Date    CREATININE 0.9 05/18/2020    BUN 17 05/18/2020     05/18/2020    K 4.7 05/18/2020     05/18/2020    CO2 27 05/18/2020         Assessment/Plan:68y.o. year old female presents for follow up. Asthma/COPD overlap- She discontinued her bronchodilators. We discussed that if she develops any respiratory symptoms she should resume Breo daily and albuterol inhaler as needed. Mediastinal adenopathy- She has what appears to be an enlarged epicardial lymph node. This has been stable between September 2018 and May 2020. Will repeat chest CT in May 2021. Lung nodules- She has ground glass and solid right-sided lung nodules which have been stable between September 2018 and May 2020. Will repeat chest CT in May 2021. Personal history of tobacco use- She no longer meets criteria for lung cancer screening due to age. She will return for follow-up in May 2021.       Linette Farias MD  [place] Pulmonology, Critical Care and Sleep

## 2022-05-25 ENCOUNTER — OFFICE VISIT (OUTPATIENT)
Dept: ORTHOPEDIC SURGERY | Age: 82
End: 2022-05-25
Payer: MEDICARE

## 2022-05-25 VITALS — BODY MASS INDEX: 30.22 KG/M2 | HEIGHT: 64 IN | WEIGHT: 177 LBS

## 2022-05-25 DIAGNOSIS — M25.511 CHRONIC RIGHT SHOULDER PAIN: ICD-10-CM

## 2022-05-25 DIAGNOSIS — M19.019 SHOULDER ARTHRITIS: Primary | ICD-10-CM

## 2022-05-25 DIAGNOSIS — G89.29 CHRONIC RIGHT SHOULDER PAIN: ICD-10-CM

## 2022-05-25 PROCEDURE — 20610 DRAIN/INJ JOINT/BURSA W/O US: CPT | Performed by: ORTHOPAEDIC SURGERY

## 2022-05-25 PROCEDURE — G8417 CALC BMI ABV UP PARAM F/U: HCPCS | Performed by: ORTHOPAEDIC SURGERY

## 2022-05-25 PROCEDURE — 1090F PRES/ABSN URINE INCON ASSESS: CPT | Performed by: ORTHOPAEDIC SURGERY

## 2022-05-25 PROCEDURE — G8427 DOCREV CUR MEDS BY ELIG CLIN: HCPCS | Performed by: ORTHOPAEDIC SURGERY

## 2022-05-25 PROCEDURE — 99203 OFFICE O/P NEW LOW 30 MIN: CPT | Performed by: ORTHOPAEDIC SURGERY

## 2022-05-25 RX ORDER — TRIAMCINOLONE ACETONIDE 40 MG/ML
40 INJECTION, SUSPENSION INTRA-ARTICULAR; INTRAMUSCULAR ONCE
Status: COMPLETED | OUTPATIENT
Start: 2022-05-25 | End: 2022-05-25

## 2022-05-25 RX ORDER — LIDOCAINE HYDROCHLORIDE 10 MG/ML
40 INJECTION, SOLUTION INFILTRATION; PERINEURAL ONCE
Status: COMPLETED | OUTPATIENT
Start: 2022-05-25 | End: 2022-05-25

## 2022-05-25 RX ADMIN — TRIAMCINOLONE ACETONIDE 40 MG: 40 INJECTION, SUSPENSION INTRA-ARTICULAR; INTRAMUSCULAR at 14:13

## 2022-05-25 RX ADMIN — LIDOCAINE HYDROCHLORIDE 40 MG: 10 INJECTION, SOLUTION INFILTRATION; PERINEURAL at 14:11

## 2022-06-06 NOTE — PROGRESS NOTES
CHIEF COMPLAINT: Right shoulder pain/ cuff tendinopathy/ impingement syndrome. HISTORY:  Ms. Ansel Lanes [de-identified] y.o. female right handed presents today for consultation request from Zahra Juarez MD for evaluation of right shoulder pain which started summer 2021. She is complaining of sharp pain 8/10. Pain is increase with elevation and decrease with rest. No radiation and no numbness and tingling sensation. No other complaint. No h/o trauma or gout. Past Medical History:   Diagnosis Date    Depression     Diabetes ([address]     controlled    GERD (gastroesophageal reflux disease)     HIGH CHOLESTEROL     Hypertension     Nerve damage     sciatic- from hip surgery    Obesity     Osteoarthritis        Past Surgical History:   Procedure Laterality Date    EYE SURGERY      JOINT REPLACEMENT      TONSILLECTOMY      TOTAL HIP ARTHROPLASTY  5/17/10    right       Social History     Socioeconomic History    Marital status:      Spouse name: Not on file    Number of children: Not on file    Years of education: Not on file    Highest education level: Not on file   Occupational History    Not on file   Tobacco Use    Smoking status: Former Smoker     Packs/day: 0.25     Years: 45.00     Pack years: 11.25     Types: Cigarettes     Start date: 1/1/1953     Quit date: 7/5/2018     Years since quitting: 3.9    Smokeless tobacco: Never Used    Tobacco comment: did smoke 1 pk per day   Substance and Sexual Activity    Alcohol use:  Yes     Alcohol/week: 1.7 - 3.3 standard drinks     Types: 2 - 4 Standard drinks or equivalent per week    Drug use: No    Sexual activity: Not on file   Other Topics Concern    Not on file   Social History Narrative    Not on file     Social Determinants of Health     Financial Resource Strain:     Difficulty of Paying Living Expenses: Not on file   Food Insecurity:     Worried About Running Out of Food in the Last Year: Not on file    [address] in the Last Year: Not on file   Transportation Needs:     Lack of Transportation (Medical): Not on file    Lack of Transportation (Non-Medical): Not on file   Physical Activity:     Days of Exercise per Week: Not on file    Minutes of Exercise per Session: Not on file   Stress:     Feeling of Stress : Not on file   Social Connections:     Frequency of Communication with Friends and Family: Not on file    Frequency of Social Gatherings with Friends and Family: Not on file    Attends Jainism Services: Not on file    Active Member of Clubs or Organizations: Not on file    Attends Club or Organization Meetings: Not on file    Marital Status: Not on file   Intimate Partner Violence:     Fear of Current or Ex-Partner: Not on file    Emotionally Abused: Not on file    Physically Abused: Not on file    Sexually Abused: Not on file   Housing Stability:     Unable to Pay for Housing in the Last Year: Not on file    Number of Places Lived in the Last Year: Not on file    Unstable Housing in the Last Year: Not on file       Family History   Problem Relation Age of Onset    Hypertension Mother     Asthma Sister        Current Outpatient Medications on File Prior to Visit   Medication Sig Dispense Refill    lisinopril (PRINIVIL;ZESTRIL) 40 MG tablet 1 tab po daily 90 tablet 1    traZODone (DESYREL) 100 MG tablet TAKE 1 TABLET BY MOUTH NIGHTLY AS NEEDED FOR SLEEP 90 tablet 0    blood glucose test strips (ACCU-CHEK RADU PLUS) strip TEST THREE TIMES DAILY AS NEEDED 300 strip 2    omeprazole (PRILOSEC) 20 MG delayed release capsule TAKE 1 CAPSULE EVERY DAY 90 capsule 3    metFORMIN (GLUCOPHAGE) 500 MG tablet TAKE 1 TABLET BY MOUTH TWICE DAILY WITH MEALS 180 tablet 3    atorvastatin (LIPITOR) 10 MG tablet TAKE 1 TABLET EVERY DAY 90 tablet 3    Blood Glucose Monitoring Suppl (ACCU-CHEK COMPACT CARE KIT) KIT As directed 1 kit 0    aspirin 81 MG EC tablet Take 81 mg by mouth daily.         calcium carbonate 600 MG TABS tablet Take 1 tablet by mouth daily. No current facility-administered medications on file prior to visit. Pertinent items are noted in HPI  Review of systems reviewed from Patient History Form and available in the patient's chart under the Media tab. No change noted. PHYSICAL EXAMINATION:  Ms. Naga Arce is a very pleasant [de-identified] y.o.  female who presents today in no acute distress, awake, alert, and oriented. She is well dressed, nourished and  groomed. Patient with normal affect. Height is  5' 4\" (1.626 m), weight is 177 lb (80.3 kg), Body mass index is 30.38 kg/m². Resting respiratory rate is 16. Examination of the gait, showed that the patient walks heel-toe with a non-antalgic gait and no limp. On evaluation of the right shoulder, range of motion is 60 degree in flexion and 60 degree in abduction. There is positve impingement signs. Motor and sensation is intact and symmetric throughout the bilateral upper extremities in the median, ulnar and radial nerve distributions. She has 2+ radial pulses bilaterally. IMAGING: X-rays were taken in the office today, 3 views of the right shoulder, and showed no acute fracture. Advanced cuff arthropathy. IMPRESSION: Right shoulder pain/ cuff tendinopathy/ impingement syndrome. PLAN: I discussed with the patient the treatment options including both surgical and non-surgical treatment. We recommended stretching exercises of the shoulder was taught to the patient today. She will take NSAIDS. I believe she will benefit from cortisone injection right shoulder, and she agreed to have. PROCEDURE:    With the patient's permission, and under sterile condition, the right shoulder was prepared and draped with alcohol and sub-acromial space was injected with a mixture of 1 ml Kenalog 40mg and 4 ml of 1% lidocaine. The patient tolerated the procedure well.    A band-aid was applied and the patient was advised to ice the shoulder for 15-20 minutes to relieve any injection site related pain. She reported a good improvement immediatly after the injection. F/u in 3-4 months, PT if needed. She understands that this may need surgery if the pain did not to resolve. Thank you very much for the kind consultation and allowing me to participate in this patient's care. I will continue to keep you apprised of her progress.         Sravani Olmedo MD

## 2022-06-07 ENCOUNTER — APPOINTMENT (OUTPATIENT)
Dept: CT IMAGING | Age: 82
DRG: 177 | End: 2022-06-07
Payer: MEDICARE

## 2022-06-07 ENCOUNTER — APPOINTMENT (OUTPATIENT)
Dept: GENERAL RADIOLOGY | Age: 82
DRG: 177 | End: 2022-06-07
Payer: MEDICARE

## 2022-06-07 ENCOUNTER — HOSPITAL ENCOUNTER (INPATIENT)
Age: 82
LOS: 1 days | Discharge: HOME | DRG: 177 | End: 2022-06-07
Attending: EMERGENCY MEDICINE | Admitting: INTERNAL MEDICINE
Payer: MEDICARE

## 2022-06-07 VITALS
RESPIRATION RATE: 22 BRPM | BODY MASS INDEX: 30.73 KG/M2 | DIASTOLIC BLOOD PRESSURE: 61 MMHG | WEIGHT: 180 LBS | HEIGHT: 64 IN | SYSTOLIC BLOOD PRESSURE: 119 MMHG | TEMPERATURE: 98 F | HEART RATE: 90 BPM | OXYGEN SATURATION: 92 %

## 2022-06-07 DIAGNOSIS — U07.1 COVID: ICD-10-CM

## 2022-06-07 DIAGNOSIS — R09.02 HYPOXEMIA: Primary | ICD-10-CM

## 2022-06-07 PROBLEM — J12.82 PNEUMONIA DUE TO COVID-19 VIRUS: Status: ACTIVE | Noted: 2022-06-07

## 2022-06-07 LAB
ALBUMIN SERPL-MCNC: 4 G/DL (ref 3.4–5)
ALP BLD-CCNC: 92 U/L (ref 40–129)
ALT SERPL-CCNC: 33 U/L (ref 10–40)
ANION GAP SERPL CALCULATED.3IONS-SCNC: 15 MMOL/L (ref 3–16)
AST SERPL-CCNC: 48 U/L (ref 15–37)
BASOPHILS ABSOLUTE: 0 K/UL (ref 0–0.2)
BASOPHILS RELATIVE PERCENT: 0.5 %
BILIRUB SERPL-MCNC: 0.3 MG/DL (ref 0–1)
BILIRUBIN DIRECT: <0.2 MG/DL (ref 0–0.3)
BILIRUBIN, INDIRECT: ABNORMAL MG/DL (ref 0–1)
BUN BLDV-MCNC: 11 MG/DL (ref 7–20)
CALCIUM SERPL-MCNC: 9.3 MG/DL (ref 8.3–10.6)
CHLORIDE BLD-SCNC: 100 MMOL/L (ref 99–110)
CO2: 23 MMOL/L (ref 21–32)
CREAT SERPL-MCNC: 0.7 MG/DL (ref 0.6–1.2)
EOSINOPHILS ABSOLUTE: 0.1 K/UL (ref 0–0.6)
EOSINOPHILS RELATIVE PERCENT: 1.6 %
GFR AFRICAN AMERICAN: >60
GFR NON-AFRICAN AMERICAN: >60
GLUCOSE BLD-MCNC: 176 MG/DL (ref 70–99)
GLUCOSE BLD-MCNC: 344 MG/DL (ref 70–99)
HCT VFR BLD CALC: 38.9 % (ref 36–48)
HEMOGLOBIN: 12.7 G/DL (ref 12–16)
LIPASE: 43 U/L (ref 13–60)
LYMPHOCYTES ABSOLUTE: 0.4 K/UL (ref 1–5.1)
LYMPHOCYTES RELATIVE PERCENT: 5.1 %
MCH RBC QN AUTO: 29 PG (ref 26–34)
MCHC RBC AUTO-ENTMCNC: 32.7 G/DL (ref 31–36)
MCV RBC AUTO: 88.7 FL (ref 80–100)
MONOCYTES ABSOLUTE: 0.6 K/UL (ref 0–1.3)
MONOCYTES RELATIVE PERCENT: 7.9 %
NEUTROPHILS ABSOLUTE: 7 K/UL (ref 1.7–7.7)
NEUTROPHILS RELATIVE PERCENT: 84.9 %
PDW BLD-RTO: 15.5 % (ref 12.4–15.4)
PERFORMED ON: ABNORMAL
PLATELET # BLD: 229 K/UL (ref 135–450)
PMV BLD AUTO: 7.8 FL (ref 5–10.5)
POTASSIUM REFLEX MAGNESIUM: 4.8 MMOL/L (ref 3.5–5.1)
PRO-BNP: 319 PG/ML (ref 0–449)
PROCALCITONIN: 0.11 NG/ML (ref 0–0.15)
RAPID INFLUENZA  B AGN: NEGATIVE
RAPID INFLUENZA A AGN: NEGATIVE
RBC # BLD: 4.39 M/UL (ref 4–5.2)
SARS-COV-2, NAAT: DETECTED
SODIUM BLD-SCNC: 138 MMOL/L (ref 136–145)
TOTAL PROTEIN: 7 G/DL (ref 6.4–8.2)
TROPONIN: <0.01 NG/ML
WBC # BLD: 8.3 K/UL (ref 4–11)

## 2022-06-07 PROCEDURE — 94640 AIRWAY INHALATION TREATMENT: CPT

## 2022-06-07 PROCEDURE — 87804 INFLUENZA ASSAY W/OPTIC: CPT

## 2022-06-07 PROCEDURE — 97530 THERAPEUTIC ACTIVITIES: CPT

## 2022-06-07 PROCEDURE — 97535 SELF CARE MNGMENT TRAINING: CPT

## 2022-06-07 PROCEDURE — 83690 ASSAY OF LIPASE: CPT

## 2022-06-07 PROCEDURE — 99285 EMERGENCY DEPT VISIT HI MDM: CPT

## 2022-06-07 PROCEDURE — 83880 ASSAY OF NATRIURETIC PEPTIDE: CPT

## 2022-06-07 PROCEDURE — 85025 COMPLETE CBC W/AUTO DIFF WBC: CPT

## 2022-06-07 PROCEDURE — 84484 ASSAY OF TROPONIN QUANT: CPT

## 2022-06-07 PROCEDURE — 71260 CT THORAX DX C+: CPT

## 2022-06-07 PROCEDURE — 94680 O2 UPTK RST&XERS DIR SIMPLE: CPT

## 2022-06-07 PROCEDURE — 6360000002 HC RX W HCPCS: Performed by: EMERGENCY MEDICINE

## 2022-06-07 PROCEDURE — 87635 SARS-COV-2 COVID-19 AMP PRB: CPT

## 2022-06-07 PROCEDURE — 6360000004 HC RX CONTRAST MEDICATION: Performed by: INTERNAL MEDICINE

## 2022-06-07 PROCEDURE — 94760 N-INVAS EAR/PLS OXIMETRY 1: CPT

## 2022-06-07 PROCEDURE — 2580000003 HC RX 258: Performed by: INTERNAL MEDICINE

## 2022-06-07 PROCEDURE — 6360000002 HC RX W HCPCS: Performed by: INTERNAL MEDICINE

## 2022-06-07 PROCEDURE — 80048 BASIC METABOLIC PNL TOTAL CA: CPT

## 2022-06-07 PROCEDURE — 80076 HEPATIC FUNCTION PANEL: CPT

## 2022-06-07 PROCEDURE — 1200000000 HC SEMI PRIVATE

## 2022-06-07 PROCEDURE — 97165 OT EVAL LOW COMPLEX 30 MIN: CPT

## 2022-06-07 PROCEDURE — 6370000000 HC RX 637 (ALT 250 FOR IP): Performed by: EMERGENCY MEDICINE

## 2022-06-07 PROCEDURE — 6370000000 HC RX 637 (ALT 250 FOR IP): Performed by: INTERNAL MEDICINE

## 2022-06-07 PROCEDURE — 84145 PROCALCITONIN (PCT): CPT

## 2022-06-07 PROCEDURE — 71045 X-RAY EXAM CHEST 1 VIEW: CPT

## 2022-06-07 PROCEDURE — 2580000003 HC RX 258: Performed by: EMERGENCY MEDICINE

## 2022-06-07 RX ORDER — ALBUTEROL SULFATE 2.5 MG/3ML
5 SOLUTION RESPIRATORY (INHALATION) ONCE
Status: COMPLETED | OUTPATIENT
Start: 2022-06-07 | End: 2022-06-07

## 2022-06-07 RX ORDER — ONDANSETRON 2 MG/ML
4 INJECTION INTRAMUSCULAR; INTRAVENOUS EVERY 6 HOURS PRN
Status: DISCONTINUED | OUTPATIENT
Start: 2022-06-07 | End: 2022-06-07 | Stop reason: HOSPADM

## 2022-06-07 RX ORDER — DEXTROSE MONOHYDRATE 50 MG/ML
100 INJECTION, SOLUTION INTRAVENOUS PRN
Status: DISCONTINUED | OUTPATIENT
Start: 2022-06-07 | End: 2022-06-07 | Stop reason: HOSPADM

## 2022-06-07 RX ORDER — INSULIN LISPRO 100 [IU]/ML
0-6 INJECTION, SOLUTION INTRAVENOUS; SUBCUTANEOUS
Status: DISCONTINUED | OUTPATIENT
Start: 2022-06-07 | End: 2022-06-07 | Stop reason: HOSPADM

## 2022-06-07 RX ORDER — MAGNESIUM HYDROXIDE/ALUMINUM HYDROXICE/SIMETHICONE 120; 1200; 1200 MG/30ML; MG/30ML; MG/30ML
30 SUSPENSION ORAL EVERY 6 HOURS PRN
Status: DISCONTINUED | OUTPATIENT
Start: 2022-06-07 | End: 2022-06-07 | Stop reason: HOSPADM

## 2022-06-07 RX ORDER — ASPIRIN 81 MG/1
81 TABLET ORAL DAILY
Status: DISCONTINUED | OUTPATIENT
Start: 2022-06-07 | End: 2022-06-07 | Stop reason: HOSPADM

## 2022-06-07 RX ORDER — DEXAMETHASONE SODIUM PHOSPHATE 10 MG/ML
6 INJECTION, SOLUTION INTRAMUSCULAR; INTRAVENOUS DAILY
Status: DISCONTINUED | OUTPATIENT
Start: 2022-06-08 | End: 2022-06-07 | Stop reason: HOSPADM

## 2022-06-07 RX ORDER — CALCIUM CARBONATE 500(1250)
500 TABLET ORAL DAILY
Status: DISCONTINUED | OUTPATIENT
Start: 2022-06-07 | End: 2022-06-07 | Stop reason: HOSPADM

## 2022-06-07 RX ORDER — POLYETHYLENE GLYCOL 3350 17 G/17G
17 POWDER, FOR SOLUTION ORAL DAILY PRN
Status: DISCONTINUED | OUTPATIENT
Start: 2022-06-07 | End: 2022-06-07 | Stop reason: HOSPADM

## 2022-06-07 RX ORDER — ACETAMINOPHEN 650 MG/1
650 SUPPOSITORY RECTAL EVERY 6 HOURS PRN
Status: DISCONTINUED | OUTPATIENT
Start: 2022-06-07 | End: 2022-06-07 | Stop reason: HOSPADM

## 2022-06-07 RX ORDER — GUAIFENESIN/DEXTROMETHORPHAN 100-10MG/5
5 SYRUP ORAL EVERY 4 HOURS PRN
Qty: 120 ML | COMMUNITY
Start: 2022-06-07 | End: 2022-06-17

## 2022-06-07 RX ORDER — LISINOPRIL 10 MG/1
40 TABLET ORAL DAILY
Status: DISCONTINUED | OUTPATIENT
Start: 2022-06-07 | End: 2022-06-07 | Stop reason: HOSPADM

## 2022-06-07 RX ORDER — SODIUM CHLORIDE 0.9 % (FLUSH) 0.9 %
5-40 SYRINGE (ML) INJECTION EVERY 12 HOURS SCHEDULED
Status: DISCONTINUED | OUTPATIENT
Start: 2022-06-07 | End: 2022-06-07 | Stop reason: HOSPADM

## 2022-06-07 RX ORDER — PANTOPRAZOLE SODIUM 40 MG/1
40 TABLET, DELAYED RELEASE ORAL
Status: DISCONTINUED | OUTPATIENT
Start: 2022-06-07 | End: 2022-06-07 | Stop reason: HOSPADM

## 2022-06-07 RX ORDER — ACETAMINOPHEN 325 MG/1
650 TABLET ORAL EVERY 6 HOURS PRN
Status: DISCONTINUED | OUTPATIENT
Start: 2022-06-07 | End: 2022-06-07 | Stop reason: HOSPADM

## 2022-06-07 RX ORDER — INSULIN LISPRO 100 [IU]/ML
0-3 INJECTION, SOLUTION INTRAVENOUS; SUBCUTANEOUS NIGHTLY
Status: DISCONTINUED | OUTPATIENT
Start: 2022-06-07 | End: 2022-06-07 | Stop reason: HOSPADM

## 2022-06-07 RX ORDER — ALBUTEROL SULFATE 90 UG/1
2 AEROSOL, METERED RESPIRATORY (INHALATION) EVERY 6 HOURS PRN
Status: DISCONTINUED | OUTPATIENT
Start: 2022-06-07 | End: 2022-06-07 | Stop reason: HOSPADM

## 2022-06-07 RX ORDER — TRAZODONE HYDROCHLORIDE 50 MG/1
100 TABLET ORAL NIGHTLY PRN
Status: DISCONTINUED | OUTPATIENT
Start: 2022-06-07 | End: 2022-06-07 | Stop reason: HOSPADM

## 2022-06-07 RX ORDER — ONDANSETRON 4 MG/1
4 TABLET, ORALLY DISINTEGRATING ORAL EVERY 8 HOURS PRN
Status: DISCONTINUED | OUTPATIENT
Start: 2022-06-07 | End: 2022-06-07 | Stop reason: HOSPADM

## 2022-06-07 RX ORDER — DEXAMETHASONE SODIUM PHOSPHATE 10 MG/ML
6 INJECTION, SOLUTION INTRAMUSCULAR; INTRAVENOUS ONCE
Status: COMPLETED | OUTPATIENT
Start: 2022-06-07 | End: 2022-06-07

## 2022-06-07 RX ORDER — 0.9 % SODIUM CHLORIDE 0.9 %
1000 INTRAVENOUS SOLUTION INTRAVENOUS ONCE
Status: COMPLETED | OUTPATIENT
Start: 2022-06-07 | End: 2022-06-07

## 2022-06-07 RX ORDER — GUAIFENESIN/DEXTROMETHORPHAN 100-10MG/5
5 SYRUP ORAL EVERY 4 HOURS PRN
Status: DISCONTINUED | OUTPATIENT
Start: 2022-06-07 | End: 2022-06-07 | Stop reason: HOSPADM

## 2022-06-07 RX ORDER — ALBUTEROL SULFATE 90 UG/1
2 AEROSOL, METERED RESPIRATORY (INHALATION) EVERY 4 HOURS PRN
Qty: 18 G | Refills: 3 | Status: SHIPPED | OUTPATIENT
Start: 2022-06-07

## 2022-06-07 RX ORDER — SODIUM CHLORIDE 0.9 % (FLUSH) 0.9 %
5-40 SYRINGE (ML) INJECTION PRN
Status: DISCONTINUED | OUTPATIENT
Start: 2022-06-07 | End: 2022-06-07 | Stop reason: HOSPADM

## 2022-06-07 RX ORDER — ATORVASTATIN CALCIUM 10 MG/1
10 TABLET, FILM COATED ORAL DAILY
Status: DISCONTINUED | OUTPATIENT
Start: 2022-06-07 | End: 2022-06-07 | Stop reason: HOSPADM

## 2022-06-07 RX ORDER — ACETAMINOPHEN 325 MG/1
650 TABLET ORAL ONCE
Status: COMPLETED | OUTPATIENT
Start: 2022-06-07 | End: 2022-06-07

## 2022-06-07 RX ORDER — ENOXAPARIN SODIUM 100 MG/ML
30 INJECTION SUBCUTANEOUS 2 TIMES DAILY
Status: DISCONTINUED | OUTPATIENT
Start: 2022-06-07 | End: 2022-06-07 | Stop reason: HOSPADM

## 2022-06-07 RX ORDER — SODIUM CHLORIDE 9 MG/ML
25 INJECTION, SOLUTION INTRAVENOUS PRN
Status: DISCONTINUED | OUTPATIENT
Start: 2022-06-07 | End: 2022-06-07 | Stop reason: HOSPADM

## 2022-06-07 RX ADMIN — ASPIRIN 81 MG: 81 TABLET, COATED ORAL at 09:18

## 2022-06-07 RX ADMIN — INSULIN LISPRO 1 UNITS: 100 INJECTION, SOLUTION INTRAVENOUS; SUBCUTANEOUS at 09:54

## 2022-06-07 RX ADMIN — ALBUTEROL SULFATE 5 MG: 2.5 SOLUTION RESPIRATORY (INHALATION) at 04:27

## 2022-06-07 RX ADMIN — Medication 10 ML: at 09:22

## 2022-06-07 RX ADMIN — IOPAMIDOL 75 ML: 755 INJECTION, SOLUTION INTRAVENOUS at 05:09

## 2022-06-07 RX ADMIN — INSULIN LISPRO 4 UNITS: 100 INJECTION, SOLUTION INTRAVENOUS; SUBCUTANEOUS at 14:16

## 2022-06-07 RX ADMIN — DEXAMETHASONE SODIUM PHOSPHATE 6 MG: 10 INJECTION, SOLUTION INTRAMUSCULAR; INTRAVENOUS at 05:37

## 2022-06-07 RX ADMIN — PANTOPRAZOLE SODIUM 40 MG: 40 TABLET, DELAYED RELEASE ORAL at 09:22

## 2022-06-07 RX ADMIN — ENOXAPARIN SODIUM 30 MG: 100 INJECTION SUBCUTANEOUS at 09:18

## 2022-06-07 RX ADMIN — ACETAMINOPHEN 650 MG: 325 TABLET ORAL at 05:41

## 2022-06-07 RX ADMIN — ATORVASTATIN CALCIUM 10 MG: 10 TABLET, FILM COATED ORAL at 09:18

## 2022-06-07 RX ADMIN — SODIUM CHLORIDE 1000 ML: 9 INJECTION, SOLUTION INTRAVENOUS at 04:25

## 2022-06-07 RX ADMIN — CALCIUM 500 MG: 500 TABLET ORAL at 09:18

## 2022-06-07 RX ADMIN — LISINOPRIL 40 MG: 10 TABLET ORAL at 09:18

## 2022-06-07 ASSESSMENT — PAIN DESCRIPTION - LOCATION: LOCATION: HEAD

## 2022-06-07 ASSESSMENT — PAIN SCALES - GENERAL
PAINLEVEL_OUTOF10: 0
PAINLEVEL_OUTOF10: 5

## 2022-06-07 ASSESSMENT — ENCOUNTER SYMPTOMS
SHORTNESS OF BREATH: 1
EYE DISCHARGE: 0
EYE ITCHING: 0
COUGH: 1
CONSTIPATION: 0
VOMITING: 0
ABDOMINAL PAIN: 0
COLOR CHANGE: 0

## 2022-06-07 NOTE — CARE COORDINATION
INITIAL CASE MANAGEMENT ASSESSMENT AND DISCHARGE SUMMARY    Reviewed chart, met with patient to assess possible discharge needs. Explained Case Management role/services. Living Situation: Patient lives in own home with spouse and nephew. There is 1 RYANN the home, patient denies any issues with mobility at this time. ADLs: independent; patient received help with housekeeping by family members     DME: cane, walker, raised toilet seat, shower chair    PT/OT Recs: not ordered     Active Services: none     Transportation: patient an active ;  to transport home from hospital     Medications: [facility]    PCP: Dennys Hughes MD      HD/PD: N/A    PLAN/COMMENTS: Patient to return home with spouse. No discharge needs identified. SW/CM provided contact information for patient or family to call with any questions. SW/CM will follow and assist as needed.     Electronically signed by Miri Jain RN on 6/7/2022 at 3:38 PM

## 2022-06-07 NOTE — ED PROVIDER NOTES
[address] [facility]      Pt Name: Vicky Escamilla  MRN: [id number]  Shaylagfjann 1940  Date of evaluation: 6/7/2022  Provider: Ashley Mckeon MD    CHIEF COMPLAINT       Chief Complaint   Patient presents with    Fatigue     states she felt fatigued all day; Per ems, pts  stated she had a syncopal episode; pt does not remember passing out; states she has been blowing nose a lot; pt's o2 sats were 88% on RA does not wear o2 at home       Naina Schuler is a [de-identified] y.o. female who presents to the emergency department with shortness of breath and fatigue. Has been going on the last 24 hours. Never happened before. Endorses 2-10 body aches. Worse with movement. Better with rest.  No other associated symptoms. Nursing Notes were reviewed. Including nursing noted for FM, Surgical History, Past Medical History, Social History, vitals, and allergies; agree with all. REVIEW OF SYSTEMS       Review of Systems   Constitutional: Positive for activity change, appetite change, chills and fatigue. Negative for diaphoresis and unexpected weight change. HENT: Negative for congestion and dental problem. Eyes: Negative for discharge and itching. Respiratory: Positive for cough and shortness of breath. Cardiovascular: Negative for chest pain and leg swelling. Gastrointestinal: Negative for abdominal pain, constipation and vomiting. Endocrine: Negative for cold intolerance and heat intolerance. Genitourinary: Negative for vaginal bleeding, vaginal discharge and vaginal pain. Musculoskeletal: Negative for neck pain and neck stiffness. Skin: Negative for color change and pallor. Neurological: Negative for tremors and weakness. Psychiatric/Behavioral: Negative for agitation and behavioral problems. Except as noted above the remainder of the review of systems was reviewed and negative.      PAST MEDICAL HISTORY     Past Medical History:   Diagnosis Date    Depression     Diabetes ([facility] [address]     controlled    GERD (gastroesophageal reflux disease)     HIGH CHOLESTEROL     Hypertension     Nerve damage     sciatic- from hip surgery    Obesity     Osteoarthritis        SURGICAL HISTORY       Past Surgical History:   Procedure Laterality Date    EYE SURGERY      JOINT REPLACEMENT      TONSILLECTOMY      TOTAL HIP ARTHROPLASTY  5/17/10    right       CURRENT MEDICATIONS       Previous Medications    ASPIRIN 81 MG EC TABLET    Take 81 mg by mouth daily. ATORVASTATIN (LIPITOR) 10 MG TABLET    TAKE 1 TABLET EVERY DAY    BLOOD GLUCOSE MONITORING SUPPL (ACCU-CHEK COMPACT CARE KIT) KIT    As directed    BLOOD GLUCOSE TEST STRIPS (ACCU-CHEK RADU PLUS) STRIP    TEST THREE TIMES DAILY AS NEEDED    CALCIUM CARBONATE 600 MG TABS TABLET    Take 1 tablet by mouth daily.       LISINOPRIL (PRINIVIL;ZESTRIL) 40 MG TABLET    1 tab po daily    METFORMIN (GLUCOPHAGE) 500 MG TABLET    TAKE 1 TABLET BY MOUTH TWICE DAILY WITH MEALS    OMEPRAZOLE (PRILOSEC) 20 MG DELAYED RELEASE CAPSULE    TAKE 1 CAPSULE EVERY DAY    TRAZODONE (DESYREL) 100 MG TABLET    TAKE 1 TABLET BY MOUTH NIGHTLY AS NEEDED FOR SLEEP       ALLERGIES     Aspirin    FAMILY HISTORY        Family History   Problem Relation Age of Onset    Hypertension Mother     Asthma Sister        SOCIAL HISTORY       Social History     Socioeconomic History    Marital status:      Spouse name: Not on file    Number of children: Not on file    Years of education: Not on file    Highest education level: Not on file   Occupational History    Not on file   Tobacco Use    Smoking status: Former Smoker     Packs/day: 0.25     Years: 45.00     Pack years: 11.25     Types: Cigarettes     Start date: 1/1/1953     Quit date: 7/5/2018     Years since quitting: 3.9    Smokeless tobacco: Never Used    Tobacco comment: did smoke 1 pk per day   Substance and Sexual Activity    Alcohol use: Yes     Alcohol/week: 1.7 - 3.3 standard drinks     Types: 2 - 4 Standard drinks or equivalent per week    Drug use: No    Sexual activity: Not on file   Other Topics Concern    Not on file   Social History Narrative    Not on file     Social Determinants of Health     Financial Resource Strain:     Difficulty of Paying Living Expenses: Not on file   Food Insecurity:     Worried About Running Out of Food in the Last Year: Not on file    Stephanie of Food in the Last Year: Not on file   Transportation Needs:     Lack of Transportation (Medical): Not on file    Lack of Transportation (Non-Medical): Not on file   Physical Activity:     Days of Exercise per Week: Not on file    Minutes of Exercise per Session: Not on file   Stress:     Feeling of Stress : Not on file   Social Connections:     Frequency of Communication with Friends and Family: Not on file    Frequency of Social Gatherings with Friends and Family: Not on file    Attends Mormonism Services: Not on file    Active Member of [address] [place] [address] or Organizations: Not on file    Attends Club or Organization Meetings: Not on file    Marital Status: Not on file   Intimate Partner Violence:     Fear of Current or Ex-Partner: Not on file    Emotionally Abused: Not on file    Physically Abused: Not on file    Sexually Abused: Not on file   Housing Stability:     Unable to Pay for Housing in the Last Year: Not on file    Number of [place] in the Last Year: Not on file    Unstable Housing in the Last Year: Not on file       PHYSICAL EXAM       ED Triage Vitals [06/07/22 0245]   BP Temp Temp Source Heart Rate Resp SpO2 Height Weight   118/71 99.1 °F (37.3 °C) Oral 99 16 96 % -- 182 lb 1.6 oz (82.6 kg)       Physical Exam  Vitals and nursing note reviewed. Constitutional:       General: She is not in acute distress. Appearance: She is well-developed. She is not ill-appearing, toxic-appearing or diaphoretic.    HENT: Head: Normocephalic and atraumatic. Right Ear: External ear normal.      Left Ear: External ear normal.   Eyes:      General:         Right eye: No discharge. Left eye: No discharge. Conjunctiva/sclera: Conjunctivae normal.      Pupils: Pupils are equal, round, and reactive to light. Cardiovascular:      Rate and Rhythm: Normal rate and regular rhythm. Heart sounds: No murmur heard. Pulmonary:      Effort: Pulmonary effort is normal. No respiratory distress. Breath sounds: Normal breath sounds. No wheezing or rales. Abdominal:      General: Bowel sounds are normal. There is no distension. Palpations: Abdomen is soft. There is no mass. Tenderness: There is no abdominal tenderness. There is no guarding or rebound. Genitourinary:     Comments: Deferred  Musculoskeletal:         General: No deformity. Normal range of motion. Cervical back: Normal range of motion and neck supple. Skin:     General: Skin is warm. Findings: No erythema or rash. Neurological:      Mental Status: She is alert and oriented to person, place, and time. She is not disoriented. Cranial Nerves: No cranial nerve deficit. Motor: No atrophy or abnormal muscle tone. Coordination: Coordination normal.   Psychiatric:         Behavior: Behavior normal.         Thought Content: Thought content normal.         DIAGNOSTIC RESULTS     RADIOLOGY:   Non-plain film images such as CT, Ultrasoundand MRI are read by the radiologist. Plain radiographic images are visualized and preliminarily interpreted by the emergency physician with the below findings:    Impression   1. Mild chronic left basilar parenchymal scarring, without acute airspace   consolidation.    2. Multiple apparent new nodular densities overlying the bilateral perihilar   spaces and right lung base could represent artifact in relation to the   patient's overlying clothing.  Partially calcified nodules are also a consideration.  Suggest a repeat PA and lateral chest x-ray with all clothing   removed to reassess for persistence of these nodular opacities. ED BEDSIDE ULTRASOUND:   Performed by ED Physician - none    LABS:  Labs Reviewed   COVID-19, RAPID - Abnormal; Notable for the following components:       Result Value    SARS-CoV-2, NAAT DETECTED (*)     All other components within normal limits   CBC WITH AUTO DIFFERENTIAL - Abnormal; Notable for the following components:    RDW 15.5 (*)     Lymphocytes Absolute 0.4 (*)     All other components within normal limits   BASIC METABOLIC PANEL W/ REFLEX TO MG FOR LOW K - Abnormal; Notable for the following components:    Glucose 176 (*)     All other components within normal limits   HEPATIC FUNCTION PANEL - Abnormal; Notable for the following components:    AST 48 (*)     All other components within normal limits   RAPID INFLUENZA A/B ANTIGENS   TROPONIN   BRAIN NATRIURETIC PEPTIDE   LIPASE   URINALYSIS WITH REFLEX TO CULTURE       All other labs were withinnormal range or not returned as of this dictation. EMERGENCY DEPARTMENT COURSE and DIFFERENTIAL DIAGNOSIS/MDM:     PMH, Surgical Hx, FH, Social Hx reviewed by myself (ETOH usage, Tobacco usage, Drug usage reviewed by myself, no pertinent Hx)- No Pertinent Hx     Old records were reviewed by me    72-year-old female with COVID and hypoxia. Steroids started. Admission. O2 support. CRITICAL CARE TIME   Total Critical Caretime was 39 minutes, excluding separately reportable procedures. There was a high probability of clinically significant/life threatening deterioration in the patient's condition which required my urgent intervention. PROCEDURES:  Unlessotherwise noted below, none    FINAL IMPRESSION      1. Hypoxemia    2.  COVID          DISPOSITION/PLAN   DISPOSITION Decision To Admit 06/07/2022 04:29:09 AM    (Please note that portions ofthis note were completed with a voice recognition program. Efforts were made to edit the dictations but occasionally words are mis-transcribed.)    Jas Mckeon MD(electronically signed)  Attending Emergency Physician        Jas Mckeon MD  06/07/22 Elvin Bhardwaj MD  06/07/22 [id number]

## 2022-06-07 NOTE — PROGRESS NOTES
Occupational Therapy  Facility/Department: [facility] PROGRESSIVE CARE  Occupational Therapy Initial Assessment/Treatment     Name: Pat Thorpe  : 1940  MRN: [id number]  Date of Service: 2022    Discharge Recommendations:  Home with assist PRN  OT Equipment Recommendations  Equipment Needed: No     Pat Thorpe scored a 20/24 on the AM-PAC ADL Inpatient form. At this time, no further OT is recommended upon discharge. Recommend patient returns to prior setting with prior services. Patient Diagnosis(es): The primary encounter diagnosis was Hypoxemia. A diagnosis of COVID was also pertinent to this visit. Past Medical History:  has a past medical history of Depression, Diabetes ([address] GERD (gastroesophageal reflux disease), HIGH CHOLESTEROL, Hypertension, Nerve damage, Obesity, and Osteoarthritis. Past Surgical History:  has a past surgical history that includes Total hip arthroplasty (5/17/10); joint replacement; eye surgery; and Tonsillectomy. Assessment   Performance deficits / Impairments: Decreased functional mobility ; Decreased endurance;Decreased ADL status; Decreased strength  Assessment: Pt is a [de-identified] old female admitted for syncopal episode and generalized weakness. Pt found to be COVID +. Pt reports baseline independence with ADLs without an AD. Pt currently functioning close to baseline this date. Pt completed ADLs with SBA on RA with Spo2 above 90%. Anticipate safe d/c home with PRN assist. Will continue to follow during inpatient stay in order to facilitate OOB activity and maintain functional independence. Prognosis: Good  Decision Making: Low Complexity  REQUIRES OT FOLLOW-UP: Yes  Activity Tolerance  Activity Tolerance: Patient Tolerated treatment well  Activity Tolerance Comments: Pt on RA for OT evaluation. SpO2 ranging from 90-94% with mobility.  Pt denies any SOB        Plan   Plan  Times per Week: 2-3x/wk  Current Treatment Recommendations: Strengthening,Balance training,Functional mobility training,Safety education & training,Self-Care / ADL,Endurance training,Patient/Caregiver education & training,Home management training     Restrictions  Restrictions/Precautions  Restrictions/Precautions: Up as Tolerated,Isolation  Position Activity Restriction  Other position/activity restrictions: COVID +    Subjective   General  Chart Reviewed: Yes  Patient assessed for rehabilitation services?: Yes  Family / Caregiver Present: No  Referring Practitioner: Deon Argueta MD  Diagnosis: Hypoxia  Subjective  Subjective: Pt in bathroom upon arrival. Stated she unplugged her telebox and walked into the bathroom. Educated pt on calling for assistance prior to getting OOB. General Comment  Comments: Pt denies any pain at this time.      Social/Functional History  Social/Functional History  Lives With: Spouse (and nephew)  Type of Home: House  Home Layout: Two level,Laundry in basement  Home Access: Stairs to enter without rails  Entrance Stairs - Number of Steps: 1 RYANN; stair lift to access bedroom and bathroom  Bathroom Shower/Tub: Tub/Shower unit,Shower chair with back  Bathroom Toilet: Standard (Lower than standard)  Bathroom Equipment: Grab bars in shower,Shower chair  Home Equipment: Cane,Walker, rolling,Walker, 4 wheeled  Has the patient had two or more falls in the past year or any fall with injury in the past year?: Yes (1 fall)  ADL Assistance: [address] [place] [address] Avenue: Independent (shared assistance with spouse and nephew)  Homemaking Responsibilities: Yes  Ambulation Assistance: Independent (No AD in the house; intermittent use of cane in community)  Transfer Assistance: Independent  Active : Yes       Objective   Heart Rate: 90  Heart Rate Source: Telemetry  BP: 119/61  BP Location: Right upper arm  MAP (Calculated): 80.33  Resp: 22  SpO2: 92 %  O2 Device: None (Room air)  Vision Exceptions: Wears glasses for reading  Hearing: Exceptions to [facility]  Hearing Exceptions: Hard of hearing/hearing concerns (Has hearing aids but does not wear them)       Observation/Palpation  Posture: Good  Safety Devices  Type of Devices: All fall risk precautions in place; Bed alarm in place; Left in bed;Call light within reach  Transfer Training  Transfer Training: Yes  Sit to Stand: Stand-by assistance  Stand to Sit: Stand-by assistance  Bed to Chair: Stand-by assistance  Toilet Transfer: Stand-by assistance  Gait  Overall Level of Assistance: Stand-by assistance (without an AD)  Base of Support: Widened  Distance (ft):  (to and from bathroom)     AROM: Generally decreased, functional (RUE limited due to previous shoulder injury)  Strength: Generally decreased, functional  Coordination: Within functional limits  Tone: Normal  Sensation: Intact  ADL  Grooming: Stand by assistance  Grooming Skilled Clinical Factors: oral care and face washing while standing at sink  Toileting: Stand by assistance        Bed mobility  Supine to Sit: Unable to assess  Sit to Supine: Stand by assistance  Scooting: Minimal assistance (to scoot up in bed)        Cognition  Overall Cognitive Status: [facility]                  Education Given To: Patient  Education Provided: Role of Therapy;Plan of Care;Precautions; ADL Adaptive Strategies;Transfer Training;Energy Conservation  Education Provided Comments: safety of call light; calling for assistance prior to OOB activity  Education Method: Demonstration  Barriers to Learning: None  Education Outcome: Verbalized understanding;Continued education needed          AM-PAC Score        AM-[facility] Inpatient Daily Activity Raw Score: 20 (06/07/22 1207)  AM-PAC Inpatient ADL T-Scale Score : 42.03 (06/07/22 1207)  ADL Inpatient CMS 0-100% Score: 38.32 (06/07/22 1207)  ADL Inpatient CMS G-Code Modifier : Robert Burnett (06/07/22 1207)    Goals  Short Term Goals  Time Frame for Short term goals: By d/c  Short Term Goal 1: Pt will complete TB dressing with ind  Short Term Goal 2: Pt will complete toileting with ind  Short Term Goal 3: Pt will complete functional transfers with Ind  Short Term Goal 4: Pt will complete standing grooming at sink for up to 5 mins with Spo2 above 90%  Patient Goals   Patient goals : to go home       Therapy Time   Individual Concurrent Group Co-treatment   Time In 1054         Time Out 1134         Minutes 40         Timed Code Treatment Minutes: 124 Sheri Torres, OTR/L

## 2022-06-07 NOTE — ED NOTES
Report called to Mookie Hand on 5N. All questions answered.       Jose Eduardo Ramos, RN  06/07/22 [address], RN  06/07/22 9920

## 2022-06-07 NOTE — DISCHARGE SUMMARY
Hospital Medicine Discharge Summary    Patient: Dodie Mallory     Gender: female  : 1940   Age: [de-identified] y.o. MRN: [id number]    Code Status: Full Code     Primary Care Provider: Josiah Ny MD    Admit Date: 2022   Discharge Date:   2022    Admitting Physician: Kristine Isaac MD  Discharge Physician: eDlano Xiao DO     Discharge Diagnoses: Active Hospital Problems    Diagnosis Date Noted    Pneumonia due to COVID-19 virus [U07.1, J12.82] 2022     Priority: Medium       Hospital Course: A [de-identified] yo female admitted with sob, found to have a + covid test. She was vaccinated for covid already. A cxr and ct chest showed No evidence of pulmonary embolism or acute pulmonary abnormality. Since admission she has been stable on room air and afebrile. Lab work up was unremarkable. She was discharged today in stable condition. Disposition:  Home    Exam:     /61   Pulse 90   Temp 98 °F (36.7 °C) (Oral)   Resp 22   Ht 5' 4\" (1.626 m)   Wt 180 lb (81.6 kg)   SpO2 92%   BMI 30.90 kg/m²     General appearance:  No apparent distress, appears stated age and cooperative. HEENT:  Normal cephalic, atraumatic without obvious deformity. Pupils equal, round, and reactive to light. Extra ocular muscles intact. Conjunctivae/corneas clear. Neck: Supple, with full range of motion. No jugular venous distention. Trachea midline. Respiratory:  Normal respiratory effort. Clear to auscultation, bilaterally without Rales/Wheezes/Rhonchi. Cardiovascular:  Regular rate and rhythm with normal S1/S2 without murmurs, rubs or gallops. Abdomen: Soft, non-tender, non-distended with normal bowel sounds. Musculoskeletal:  No clubbing, cyanosis or edema bilaterally. Full range of motion without deformity. Skin: Skin color, texture, turgor normal.  No rashes or lesions. Neurologic:  Neurovascularly intact without any focal sensory/motor deficits.  Cranial nerves: II-XII intact, grossly non-focal.  Psychiatric:  Alert and oriented, thought content appropriate, normal insight    Consults:     None    Labs: For convenience and continuity at follow-up the following most recent labs are provided:    Lab Results   Component Value Date    WBC 8.3 06/07/2022    HGB 12.7 06/07/2022    HCT 38.9 06/07/2022    MCV 88.7 06/07/2022     06/07/2022     06/07/2022    K 4.8 06/07/2022     06/07/2022    CO2 23 06/07/2022    BUN 11 06/07/2022    CREATININE 0.7 06/07/2022    CALCIUM 9.3 06/07/2022    ALKPHOS 92 06/07/2022    ALT 33 06/07/2022    AST 48 06/07/2022    BILITOT 0.3 06/07/2022    BILIDIR <0.2 06/07/2022    LABALBU 4.0 06/07/2022    LDLCALC 94 05/17/2022    TRIG 88 05/17/2022     No results found for: INR    Radiology:  CT CHEST PULMONARY EMBOLISM W CONTRAST   Final Result   No evidence of pulmonary embolism or acute pulmonary abnormality. RECOMMENDATIONS:   Unavailable         XR CHEST PORTABLE   Preliminary Result   1. Mild chronic left basilar parenchymal scarring, without acute airspace   consolidation. 2. Multiple apparent new nodular densities overlying the bilateral perihilar   spaces and right lung base could represent artifact in relation to the   patient's overlying clothing. Partially calcified nodules are also a   consideration. Suggest a repeat PA and lateral chest x-ray with all clothing   removed to reassess for persistence of these nodular opacities.              Discharge Medications:   Current Discharge Medication List      START taking these medications    Details   albuterol sulfate HFA (PROVENTIL;VENTOLIN;PROAIR) 108 (90 Base) MCG/ACT inhaler Inhale 2 puffs into the lungs every 4 hours as needed for Wheezing or Shortness of Breath  Qty: 18 g, Refills: 3      guaiFENesin-dextromethorphan (ROBITUSSIN DM) 100-10 MG/5ML syrup Take 5 mLs by mouth every 4 hours as needed for Cough  Qty: 120 mL           Current Discharge Medication List        Current Discharge Medication List      CONTINUE these medications which have NOT CHANGED    Details   lisinopril (PRINIVIL;ZESTRIL) 40 MG tablet 1 tab po daily  Qty: 90 tablet, Refills: 1    Associated Diagnoses: Type 2 diabetes mellitus with hyperglycemia, unspecified whether long term insulin use ([facility] High cholesterol; Hypertension, unspecified type; Counseling and coordination of care; Pain in joint, multiple sites      traZODone (DESYREL) 100 MG tablet TAKE 1 TABLET BY MOUTH NIGHTLY AS NEEDED FOR SLEEP  Qty: 90 tablet, Refills: 0    Associated Diagnoses: High cholesterol; Hypertension, unspecified type; Counseling and coordination of care; Pain in joint, multiple sites      blood glucose test strips (ACCU-CHEK RADU PLUS) strip TEST THREE TIMES DAILY AS NEEDED  Qty: 300 strip, Refills: 2    Associated Diagnoses: Type 2 diabetes mellitus with hyperglycemia, unspecified whether long term insulin use ([facility])      omeprazole (PRILOSEC) 20 MG delayed release capsule TAKE 1 CAPSULE EVERY DAY  Qty: 90 capsule, Refills: 3    Associated Diagnoses: Type 2 diabetes mellitus with hyperglycemia, unspecified whether long term insulin use ([facility])      metFORMIN (GLUCOPHAGE) 500 MG tablet TAKE 1 TABLET BY MOUTH TWICE DAILY WITH MEALS  Qty: 180 tablet, Refills: 3    Associated Diagnoses: Type 2 diabetes mellitus with hyperglycemia, unspecified whether long term insulin use ([facility])      atorvastatin (LIPITOR) 10 MG tablet TAKE 1 TABLET EVERY DAY  Qty: 90 tablet, Refills: 3    Associated Diagnoses: Back pain, unspecified back location, unspecified back pain laterality, unspecified chronicity;  Type 2 diabetes mellitus with hyperglycemia, unspecified whether long term insulin use ([facility])      Blood Glucose Monitoring Suppl (ACCU-CHEK COMPACT CARE KIT) KIT As directed  Qty: 1 kit, Refills: 0    Associated Diagnoses: Type 2 diabetes mellitus with hyperglycemia, unspecified whether long term insulin use ([facility])      aspirin 81 MG EC tablet Take 81 mg by mouth daily.        calcium carbonate 600 MG TABS tablet Take 1 tablet by mouth daily. Current Discharge Medication List          Follow-up appointments:  one week    Provider Follow-up:    pcp    Condition at Discharge:  Stable    The patient was seen and examined on day of discharge and this discharge summary is in conjunction with any daily progress note from day of discharge. Time Spent on discharge is one hour  in the examination, evaluation, counseling and review of medications and discharge plan. Signed:    Alex Patel DO   6/7/2022      Thank you Conor Gomez MD for the opportunity to be involved in this patient's care. If you have any questions or concerns please feel free to contact me at [phone].

## 2022-06-07 NOTE — DISCHARGE INSTR - COC
Continuity of Care Form    Patient Name: Yolanda Carvalho   :  1940  MRN:  [id number]    [facility] date:  2022  Discharge date:  ***    Code Status Order: Full Code   Advance Directives:      Admitting Physician:  Gigi Cano MD  PCP: Yann Hdez MD    Discharging Nurse: [facility] Unit/Room#: C8M-2007/5280-01  Discharging Unit Phone Number: ***    Emergency Contact:   Extended Emergency Contact Information  Primary Emergency Contact: Anthony Mario  Address: [address] [place] [address] Phone: [phone]  Mobile Phone: [phone]  Relation: Spouse  Secondary Emergency Contact: Derik Ventura  Relation: Niece/Nephew    Past Surgical History:  Past Surgical History:   Procedure Laterality Date    EYE SURGERY      JOINT REPLACEMENT      TONSILLECTOMY      TOTAL HIP ARTHROPLASTY  5/17/10    right       Immunization History:   Immunization History   Administered Date(s) Administered    Influenza, High Dose (Fluzone 65 yrs and older) 10/09/2015, 2016, 2019    Influenza, High-dose, Azalee Fees, 65 yrs +, IM (Fluzone) 2020    Influenza, MDCK Quadv, with preserv IM (Flucelvax 2 yrs and older) 10/09/2017    Influenza, Triv, inactivated, subunit, adjuvanted, IM (Fluad 65 yrs and older) 10/16/2018    Pneumococcal Conjugate 13-valent (Lodema Jenny) 2019    Zoster Live (Zostavax) 10/09/2013       Active Problems:  Patient Active Problem List   Diagnosis Code    Status post right hip replacement Z96.641    Trochanteric bursitis of right hip M70.61    Shoulder arthritis M19.019    Shoulder impingement M75.40    Rotator cuff tendonitis M75.80    Hypertension I10    Pure hypercholesterolemia E78.00    GERD (gastroesophageal reflux disease) K21.9    Obesity E66.9    Depression F32. A    Osteoarthritis M19.90    Shortness of breath R06.02    Pneumonia due to COVID-19 virus U07.1, J12.82       Isolation/Infection:   Isolation Droplet Plus          Patient Infection Status       Infection Onset Added Last Indicated Last Indicated By Review Planned Expiration Resolved Resolved By    COVID-19 22 COVID-19, Rapid 22      Resolved    COVID-19 (Rule Out) 22 COVID-19, Rapid (Ordered)   22 Rule-Out Test Resulted            Nurse Assessment:  Last Vital Signs: /61   Pulse 90   Temp 98 °F (36.7 °C) (Oral)   Resp 22   Ht 5' 4\" (1.626 m)   Wt 180 lb (81.6 kg)   SpO2 92%   BMI 30.90 kg/m²     Last documented pain score (0-10 scale): Pain Level: 0  Last Weight:   Wt Readings from Last 1 Encounters:   22 180 lb (81.6 kg)     Mental Status:  {IP PT MENTAL STATUS:}    IV Access:  { ESTHELA IV ACCESS:580502390}    Nursing Mobility/ADLs:  Walking   {CHP DME XNEV:438364777}  Transfer  {CHP DME LWAA:313093738}  Bathing  {CHP DME TVTZ:353988538}  Dressing  {CHP DME QTCM:113559446}  Toileting  {CHP DME ONXF:500681521}  Feeding  {CHP DME CYOE:979357765}  Med Admin  {CHP DME LBXU:542127937}  Med Delivery   { ESTHELA MED Delivery:395987108}    Wound Care Documentation and Therapy:        Elimination:  Continence: Bowel: {YES / AO:90888}  Bladder: {YES / PK:67432}  Urinary Catheter: {Urinary Catheter:406548663}   Colostomy/Ileostomy/Ileal Conduit: {YES / GC:89113}       Date of Last BM: ***  No intake or output data in the 24 hours ending 22 1315  No intake/output data recorded.     Safety Concerns:     [address] Safety Concerns:002247010}    Impairments/Disabilities:      [address] Impairments/Disabilities:808335060}    Nutrition Therapy:  Current Nutrition Therapy:   [address] Diet List:443691685}    Routes of Feeding: {CHP DME Other Feedings:456871315}  Liquids: {Slp liquid thickness:08199}  Daily Fluid Restriction: {CHP DME Yes amt example:964740889}  Last Modified Barium Swallow with Video (Video Swallowing Test): {Done Not Done UX}    Treatments at the Time of Hospital Discharge:   Respiratory Treatments: ***  Oxygen Therapy:  {Therapy; copd oxygen:09318}  Ventilator:    {[facility] Vent [facility]:405873907}    Rehab Therapies: {THERAPEUTIC INTERVENTION:2471965519}  Weight Bearing Status/Restrictions: { CC Weight Bearin}  Other Medical Equipment (for information only, NOT a DME order):  {EQUIPMENT:096824734}  Other Treatments: ***    Patient's personal belongings (please select all that are sent with patient):  {[facility] DME Belongings:165894682}    RN SIGNATURE:  {Esignature:756345434}    CASE MANAGEMENT/SOCIAL WORK SECTION    Inpatient Status Date: ***    Readmission Risk Assessment Score:  Readmission Risk              Risk of Unplanned Readmission:  11           Discharging to Facility/ Agency   Name:   Address:  Phone:  Fax:    Dialysis Facility (if applicable)   Name:  Address:  Dialysis Schedule:  Phone:  Fax:    / signature: {Esignature:095453457}    PHYSICIAN SECTION    Prognosis: {Prognosis:7546196195}    Condition at Discharge: [address] [place] [address] Patient Condition:033230646}    Rehab Potential (if transferring to Rehab): {Prognosis:8247472746}    Recommended Labs or Other Treatments After Discharge: ***    Physician Certification: I certify the above information and transfer of Marie Quiles  is necessary for the continuing treatment of the diagnosis listed and that she requires {Admit to Appropriate Level of Care:06267} for {GREATER/LESS:190936872} 30 days.      Update Admission H&P: {[facility] DME Changes in DUTI}    PHYSICIAN SIGNATURE:  {Esignature:062047480}

## 2022-06-07 NOTE — PROGRESS NOTES
HOME OXYGEN EVALUATION: Pt is 92% SpO2 on room air at rest and is 94% SpO2 on room air with ambulation.  Electronically signed by Saji Bonilla RCP on 6/7/2022 at 12:23 PM

## 2022-06-07 NOTE — H&P
Hospital Medicine History and Physical    6/7/2022    Date of Admission: 6/7/2022    Date of Service: Pt seen/examined on 6/7/2022 and admitted to inpatient. Assessment/plan:  1. COVID-19 pneumonia. Chest x-ray is unremarkable for acute pathology; however, suspect there is underlying pneumonic process, especially with associated hypoxia. Will obtain CT-PE protocol to document pneumonia, rule out pulmonary embolism. Will continue on IV Decadron. Will place on Robitussin for cough. Monitor pulse oximeter closely. 2. Acute respiratory failure with hypoxia. Lowest oxygen saturation of 87 to 88% on room air in the emergency room. Continue supplemental oxygen with plan to wean as tolerated. Treat underlying pneumonic process. 3. Generalized weakness. Likely secondary to acute medical conditions as noted above. Treat underlying conditions and monitor for improvement. Initiate therapy consult. Fall precautions. 4. Other comorbidities: history of uncontrolled diabetes type 2 (most recent hemoglobin A1c of 7.1 in May 2022), CAD, hyperlipidemia, essential hypertension, osteoarthritis, obesity with BMI of 31 kg/m², depression. Activities: Up with assist  Prophylaxis: Subcutaneous Lovenox 30 mg twice daily  Code status: Full code    ==========================================================  Chief complaint:  Chief Complaint   Patient presents with    Fatigue     states she felt fatigued all day; Per ems, pts  stated she had a syncopal episode; pt does not remember passing out; states she has been blowing nose a lot; pt's o2 sats were 88% on RA does not wear o2 at home         History of Presenting Illness:   This is a pleasant [de-identified] y.o. female with history of uncontrolled diabetes type 2 (most recent hemoglobin A1c of 7.1 in May 2022), CAD, hyperlipidemia, essential hypertension, osteoarthritis, obesity with BMI of 31 kg/m², depression, who presents to the emergency room with complaints of shortness of breath, sneezing, body aches, generalized weakness, ongoing for the past 24 hours. She reports she has not had much cough. She has had fever but no chills. She tested positive for COVID-19 in the emergency room. Chest x-ray was unremarkable for acute pathology. She had lower oxygen saturation of 87 to 88% on room air in the emergency room, currently requiring 2 L/min supplemental oxygen. She received a dose of IV Decadron in the emergency room. She is being admitted for further management. Past Medical History:      Diagnosis Date    Depression     Diabetes ([address]     controlled    GERD (gastroesophageal reflux disease)     HIGH CHOLESTEROL     Hypertension     Nerve damage     sciatic- from hip surgery    Obesity     Osteoarthritis        Past Surgical History:      Procedure Laterality Date    EYE SURGERY      JOINT REPLACEMENT      TONSILLECTOMY      TOTAL HIP ARTHROPLASTY  5/17/10    right       Medications (prior to admission):  Prior to Admission medications    Medication Sig Start Date End Date Taking? Authorizing Provider   lisinopril (PRINIVIL;ZESTRIL) 40 MG tablet 1 tab po daily 5/17/22   Toya Han MD   traZODone (DESYREL) 100 MG tablet TAKE 1 TABLET BY MOUTH NIGHTLY AS NEEDED FOR SLEEP 4/15/22   Toya Han MD   blood glucose test strips (ACCU-CHEK RADU PLUS) strip TEST THREE TIMES DAILY AS NEEDED 4/15/22   Toya Han MD   omeprazole (PRILOSEC) 20 MG delayed release capsule TAKE 1 CAPSULE EVERY DAY 3/4/21   Toya Han MD   metFORMIN (GLUCOPHAGE) 500 MG tablet TAKE 1 TABLET BY MOUTH TWICE DAILY WITH MEALS 2/11/21   Toya Han MD   atorvastatin (LIPITOR) 10 MG tablet TAKE 1 TABLET EVERY DAY 2/11/21   Toya Han MD   Blood Glucose Monitoring Suppl (ACCU-CHEK COMPACT CARE KIT) KIT As directed 8/27/18   Toya Han MD   aspirin 81 MG EC tablet Take 81 mg by mouth daily.       Historical Provider, MD   calcium carbonate 600 MG TABS tablet Take 1 tablet by mouth daily. Historical Provider, MD       Allergy(ies):  Aspirin    Social History:  TOBACCO:  reports that she quit smoking about 3 years ago. Her smoking use included cigarettes. She started smoking about 69 years ago. She has a 11.25 pack-year smoking history. She has never used smokeless tobacco.  ETOH:  reports current alcohol use of about 1.7 - 3.3 standard drinks of alcohol per week. Family History:      Problem Relation Age of Onset    Hypertension Mother     Asthma Sister        Review of Systems:  Pertinent positives are listed in HPI. At least 10-point ROS reviewed and were negative. Vitals and physical examination:  /71   Pulse 100   Temp 99.1 °F (37.3 °C) (Oral)   Resp 22   Wt 182 lb 1.6 oz (82.6 kg)   SpO2 98%   BMI 31.26 kg/m²   Gen/overall appearance: Not in acute distress. Alert. Oriented x3. Head: Normocephalic, atraumatic  Eyes: EOMI, good acuity  ENT: Oral mucosa moist  Neck: No JVD, thyromegaly  CVS: Nml S1S2, no MRG, RRR  Pulm: Clear bilaterally. No crackles/wheezes  Gastrointestinal: Soft, NT/ND, +BS  Musculoskeletal: No edema. Warm  Neuro: No focal deficit. Moves extremity spontaneously. Psychiatry: Appropriate affect. Not agitated. Skin: Warm, dry with normal turgor. No rash  Capillary refill: Brisk,< 3 seconds   Peripheral Pulses: +2 palpable, equal bilaterally       Labs/imaging/EKG:  CBC:   Recent Labs     06/07/22  0324   WBC 8.3   HGB 12.7        BMP:    Recent Labs     06/07/22  0324      K 4.8      CO2 23   BUN 11   CREATININE 0.7   GLUCOSE 176*     Hepatic:   Recent Labs     06/07/22  0324   AST 48*   ALT 33   BILITOT 0.3   ALKPHOS 92       XR SHOULDER RIGHT (MIN 2 VIEWS)    Result Date: 5/25/2022  Radiology exam is complete. No Radiologist dictation. Please follow up with ordering provider. XR CHEST PORTABLE    1. Mild chronic left basilar parenchymal scarring, without acute airspace consolidation. 2. Multiple apparent new nodular densities overlying the bilateral perihilar spaces and right lung base could represent artifact in relation to the patient's overlying clothing. Partially calcified nodules are also a consideration. Suggest a repeat PA and lateral chest x-ray with all clothing removed to reassess for persistence of these nodular opacities.      Discussed with ER MD.      Thank you Jonathan Rosario MD for the opportunity to be involved in this patient's care.    -----------------------------  Deon Argueta MD  [facility]

## 2022-06-08 ENCOUNTER — CARE COORDINATION (OUTPATIENT)
Dept: CASE MANAGEMENT | Age: 82
End: 2022-06-08

## 2022-06-08 NOTE — CARE COORDINATION
[facility] Transitions Initial Follow Up Call    Call within 2 business days of discharge: Yes    Patient: Lobo Ponce Patient : 1940   MRN: [id number]  Reason for Admission: PNA d/t COVID  Discharge Date: 22 RARS: Readmission Risk Score: 7.5 ( )      Last Discharge [facility]       Complaint Diagnosis Description Type Department Provider    22 Fatigue Hypoxemia . .. ED to Hosp-Admission (Discharged) (ADMITTED) WSDARSHANA 5N Bernadette Garcia DO; David Tipton MD... Spoke with: ROLAN    Facility: [facility]    Attempted to reach patient via phone for initial post hospital transition call. Unable to leave . Message states \"Wireless customer is unavailable. \"   Robb Landrum LPN CC  Care Transitions  [phone]    Care Transitions 24 Hour Call    Do you have support at home?: Partner/Spouse/SO  Care Transitions Interventions         Follow Up  Future Appointments   Date Time Provider Christa Gama   2022 10:00 AM Mary Brice MD [facility]   2022  1:00 PM MD Will Bennett [facility]       Robb Landrum LPN

## 2022-06-09 ENCOUNTER — CARE COORDINATION (OUTPATIENT)
Dept: CASE MANAGEMENT | Age: 82
End: 2022-06-09

## 2022-06-09 NOTE — CARE COORDINATION
2nd and final attempt at [place] [address] monitoring discharge phone call. Unable to leave message at number listed as home. Left message at number listed as mobile. Informed Zee Gray this would be the final CTN outreach. [employer] message sent to patient.     Park Ng RN BSN  Care Transition Nurse  [phone]

## 2022-08-24 ENCOUNTER — OFFICE VISIT (OUTPATIENT)
Dept: ORTHOPEDIC SURGERY | Age: 82
End: 2022-08-24
Payer: MEDICARE

## 2022-08-24 VITALS — BODY MASS INDEX: 29.37 KG/M2 | HEIGHT: 64 IN | WEIGHT: 172 LBS

## 2022-08-24 DIAGNOSIS — M75.41 IMPINGEMENT SYNDROME OF RIGHT SHOULDER: Primary | ICD-10-CM

## 2022-08-24 PROCEDURE — 20610 DRAIN/INJ JOINT/BURSA W/O US: CPT | Performed by: ORTHOPAEDIC SURGERY

## 2022-08-24 RX ORDER — TRIAMCINOLONE ACETONIDE 40 MG/ML
40 INJECTION, SUSPENSION INTRA-ARTICULAR; INTRAMUSCULAR ONCE
Status: COMPLETED | OUTPATIENT
Start: 2022-08-24 | End: 2022-08-24

## 2022-08-24 RX ORDER — LIDOCAINE HYDROCHLORIDE 10 MG/ML
40 INJECTION, SOLUTION INFILTRATION; PERINEURAL ONCE
Status: COMPLETED | OUTPATIENT
Start: 2022-08-24 | End: 2022-08-24

## 2022-08-24 RX ADMIN — LIDOCAINE HYDROCHLORIDE 40 MG: 10 INJECTION, SOLUTION INFILTRATION; PERINEURAL at 13:19

## 2022-08-24 RX ADMIN — TRIAMCINOLONE ACETONIDE 40 MG: 40 INJECTION, SUSPENSION INTRA-ARTICULAR; INTRAMUSCULAR at 13:19

## 2022-08-24 NOTE — PROGRESS NOTES
CHIEF COMPLAINT: Right shoulder pain/ cuff tendinopathy/ impingement syndrome. HISTORY:  Ms. Wayne Valentine [age] [facility] y.o. female right handed presents today for f/u. She was seen on 2022 as a consultation request from Hugo Stockton MD for evaluation of right shoulder pain which started summer 2021. She is complaining of sharp pain 8/10. Pain is increase with elevation and decrease with rest. No radiation and no numbness and tingling sensation. No other complaint. No h/o trauma or gout. Past Medical History:   Diagnosis Date    Depression     Diabetes ([address]     controlled    GERD (gastroesophageal reflux disease)     HIGH CHOLESTEROL     Hypertension     Nerve damage     sciatic- from hip surgery    Obesity     Osteoarthritis        Past Surgical History:   Procedure Laterality Date    EYE SURGERY      JOINT REPLACEMENT      TONSILLECTOMY      TOTAL HIP ARTHROPLASTY  5/17/10    right       Social History     Socioeconomic History    Marital status:      Spouse name: Not on file    Number of children: Not on file    Years of education: Not on file    Highest education level: Not on file   Occupational History    Not on file   Tobacco Use    Smoking status: Former     Packs/day: 0.25     Years: 45.00     Pack years: 11.25     Types: Cigarettes     Start date: 1953     Quit date: 2018     Years since quittin.1    Smokeless tobacco: Never    Tobacco comments:     did smoke 1 pk per day   Substance and Sexual Activity    Alcohol use:  Yes     Alcohol/week: 1.7 - 3.3 standard drinks     Types: 2 - 4 Standard drinks or equivalent per week    Drug use: No    Sexual activity: Not on file   Other Topics Concern    Not on file   Social History Narrative    Not on file     Social Determinants of Health     Financial Resource Strain: Not on file   Food Insecurity: Not on file   Transportation Needs: Not on file   Physical Activity: Not on file   Stress: Not on file   Social Connections: Not on file the patient walks heel-toe with a non-antalgic gait and no limp. On evaluation of the right shoulder, range of motion is 60 degree in flexion and 60 degree in abduction. There is positve impingement signs. Motor and sensation is intact and symmetric throughout the bilateral upper extremities in the median, ulnar and radial nerve distributions. She has 2+ radial pulses bilaterally. IMAGING: X-rays were taken in the office 5/25/2022, 3 views of the right shoulder, and showed no acute fracture. Advanced cuff arthropathy. IMPRESSION: Right shoulder pain/ cuff tendinopathy/ impingement syndrome. PLAN: I discussed with the patient the treatment options including both surgical and non-surgical treatment. We recommended stretching exercises of the shoulder was taught to the patient today. She will take NSAIDS. I believe she will benefit from cortisone injection right shoulder, and she agreed to have. PROCEDURE:    With the patient's permission, and under sterile condition, the right shoulder was prepared and draped with alcohol and sub-acromial space was injected with a mixture of 1 ml Kenalog 40mg and 4 ml of 1% lidocaine. The patient tolerated the procedure well. A band-aid was applied and the patient was advised to ice the shoulder for 15-20 minutes to relieve any injection site related pain. She reported a good improvement immediatly after the injection. F/u in 3-4 months, PT if needed. She understands that this may need surgery if the pain did not to resolve.        Ryan Knapp MD

## 2022-12-13 ENCOUNTER — HOSPITAL ENCOUNTER (OUTPATIENT)
Dept: GENERAL RADIOLOGY | Age: 82
Discharge: HOME | End: 2022-12-13
Payer: MEDICARE

## 2022-12-13 ENCOUNTER — HOSPITAL ENCOUNTER (OUTPATIENT)
Age: 82
Discharge: HOME | End: 2022-12-13
Payer: MEDICARE

## 2022-12-13 DIAGNOSIS — M54.9 BACK PAIN, UNSPECIFIED BACK LOCATION, UNSPECIFIED BACK PAIN LATERALITY, UNSPECIFIED CHRONICITY: ICD-10-CM

## 2022-12-13 PROCEDURE — 72110 X-RAY EXAM L-2 SPINE 4/>VWS: CPT

## 2024-06-06 ENCOUNTER — HOSPITAL ENCOUNTER (OUTPATIENT)
Dept: CT IMAGING | Age: 84
Discharge: HOME | End: 2024-06-06
Attending: INTERNAL MEDICINE
Payer: MEDICARE

## 2024-06-06 DIAGNOSIS — R68.81 EARLY SATIETY: ICD-10-CM

## 2024-06-06 DIAGNOSIS — R63.4 WEIGHT LOSS: ICD-10-CM

## 2024-06-06 PROCEDURE — 74176 CT ABD & PELVIS W/O CONTRAST: CPT

## 2025-06-11 ENCOUNTER — HOSPITAL ENCOUNTER (OUTPATIENT)
Dept: VASCULAR LAB | Age: 85
Discharge: HOME | End: 2025-06-13
Attending: INTERNAL MEDICINE
Payer: MEDICARE

## 2025-06-11 DIAGNOSIS — R09.89 BILATERAL CAROTID BRUITS: ICD-10-CM

## 2025-06-11 LAB
VAS LEFT ARM BP: 96 MMHG
VAS LEFT CCA DIST EDV: 15.7 CM/S
VAS LEFT CCA DIST PSV: 78.3 CM/S
VAS LEFT CCA MID EDV: 10.2 CM/S
VAS LEFT CCA MID PSV: 75 CM/S
VAS LEFT CCA PROX EDV: 16.8 CM/S
VAS LEFT CCA PROX PSV: 94.8 CM/S
VAS LEFT ECA EDV: 0 CM/S
VAS LEFT ECA PSV: 93 CM/S
VAS LEFT ICA DIST EDV: 23.7 CM/S
VAS LEFT ICA DIST PSV: 92.9 CM/S
VAS LEFT ICA MID EDV: 27.3 CM/S
VAS LEFT ICA MID PSV: 77.9 CM/S
VAS LEFT ICA PROX EDV: 16.4 CM/S
VAS LEFT ICA PROX PSV: 59.2 CM/S
VAS LEFT ICA/CCA PSV: 1.2 NO UNITS
VAS LEFT SUBCLAVIAN MID PSV: 100 CM/S
VAS LEFT SUBCLAVIAN PROX PSV: 440 CM/S
VAS LEFT VERTEBRAL EDV: 0 CM/S
VAS LEFT VERTEBRAL PSV: 25.3 CM/S
VAS RIGHT ARM BP: 132 MMHG
VAS RIGHT CCA DIST EDV: 8.7 CM/S
VAS RIGHT CCA DIST PSV: 48.2 CM/S
VAS RIGHT CCA MID EDV: 7.7 CM/S
VAS RIGHT CCA MID PSV: 58.6 CM/S
VAS RIGHT CCA PROX EDV: 11.6 CM/S
VAS RIGHT CCA PROX PSV: 88.6 CM/S
VAS RIGHT ECA EDV: 0 CM/S
VAS RIGHT ECA PSV: 61.4 CM/S
VAS RIGHT ICA DIST EDV: 22.8 CM/S
VAS RIGHT ICA DIST PSV: 87.9 CM/S
VAS RIGHT ICA MID EDV: 32 CM/S
VAS RIGHT ICA MID PSV: 167.7 CM/S
VAS RIGHT ICA PROX EDV: 44.9 CM/S
VAS RIGHT ICA PROX PSV: 232.4 CM/S
VAS RIGHT ICA/CCA PSV: 4.8 NO UNITS
VAS RIGHT SUBCLAVIAN PROX PSV: 185 CM/S
VAS RIGHT VERTEBRAL EDV: 17.9 CM/S
VAS RIGHT VERTEBRAL PSV: 90.5 CM/S

## 2025-06-11 PROCEDURE — 93880 EXTRACRANIAL BILAT STUDY: CPT | Performed by: INTERNAL MEDICINE

## 2025-06-11 PROCEDURE — 93880 EXTRACRANIAL BILAT STUDY: CPT

## 2025-06-16 ENCOUNTER — TELEPHONE (OUTPATIENT)
Dept: VASCULAR SURGERY | Age: 85
End: 2025-06-16

## 2025-06-16 NOTE — TELEPHONE ENCOUNTER
Patient has been referred from Dr. Cantu for carotid stenosis and is scheduled for 7/28/2025. She would like a sooner appointment if possible. Please give her a call if possible.,

## 2025-07-28 ENCOUNTER — OFFICE VISIT (OUTPATIENT)
Dept: VASCULAR SURGERY | Age: 85
End: 2025-07-28
Payer: MEDICARE

## 2025-07-28 VITALS — HEIGHT: 64 IN | BODY MASS INDEX: 28.85 KG/M2 | WEIGHT: 169 LBS

## 2025-07-28 DIAGNOSIS — I65.21 ASYMPTOMATIC STENOSIS OF RIGHT CAROTID ARTERY: Primary | ICD-10-CM

## 2025-07-28 DIAGNOSIS — I77.1 SUBCLAVIAN ARTERY STENOSIS, LEFT: ICD-10-CM

## 2025-07-28 PROCEDURE — G8417 CALC BMI ABV UP PARAM F/U: HCPCS | Performed by: SURGERY

## 2025-07-28 PROCEDURE — 1159F MED LIST DOCD IN RCRD: CPT | Performed by: SURGERY

## 2025-07-28 PROCEDURE — 1090F PRES/ABSN URINE INCON ASSESS: CPT | Performed by: SURGERY

## 2025-07-28 PROCEDURE — 99204 OFFICE O/P NEW MOD 45 MIN: CPT | Performed by: SURGERY

## 2025-07-28 PROCEDURE — G8427 DOCREV CUR MEDS BY ELIG CLIN: HCPCS | Performed by: SURGERY

## 2025-07-28 PROCEDURE — G8399 PT W/DXA RESULTS DOCUMENT: HCPCS | Performed by: SURGERY

## 2025-07-28 PROCEDURE — 1123F ACP DISCUSS/DSCN MKR DOCD: CPT | Performed by: SURGERY

## 2025-07-28 PROCEDURE — 1036F TOBACCO NON-USER: CPT | Performed by: SURGERY

## 2025-07-28 ASSESSMENT — ENCOUNTER SYMPTOMS
GASTROINTESTINAL NEGATIVE: 1
RESPIRATORY NEGATIVE: 1
EYES NEGATIVE: 1
ALLERGIC/IMMUNOLOGIC NEGATIVE: 1

## 2025-07-28 NOTE — PROGRESS NOTES
Joselyn Mario (:  1940) is a 85 y.o. female, here for evaluation of the following chief complaint(s):  New Patient (Ref by Dr. Cantu for Carotid Stenosis, no issues, CDS 25)      Subjective   SUBJECTIVE/OBJECTIVE:  HPI  Ms. Mario is a 85-year-old female presents to clinic today for evaluation of asymptomatic right ICA stenosis.  Patient underwent duplex as ordered by Dr. Cantu.  This duplex showed moderate right ICA stenosis.  This also showed a left subclavian stenosis as noted by differing blood pressures and monophasic flow in the left subclavian consistent with inflow disease.  Patient presents today for evaluation and overall feels well.  She denies any strokelike symptoms, lateralizing symptoms, amaurosis.  She is right-handed.  She denies any symptoms in her left upper extremity including weakness, numbness, fatigue, pain.    Review of Systems   Constitutional: Negative.    HENT: Negative.     Eyes: Negative.    Respiratory: Negative.     Cardiovascular: Negative.    Gastrointestinal: Negative.    Endocrine: Negative.    Genitourinary: Negative.    Musculoskeletal: Negative.    Skin: Negative.    Allergic/Immunologic: Negative.    Neurological: Negative.    Hematological: Negative.    Psychiatric/Behavioral: Negative.            Objective   Physical Exam  Vitals and nursing note reviewed.   Constitutional:       General: She is not in acute distress.     Appearance: Normal appearance.   HENT:      Head: Normocephalic and atraumatic.   Cardiovascular:      Rate and Rhythm: Normal rate and regular rhythm.   Pulmonary:      Effort: Pulmonary effort is normal.   Abdominal:      General: There is no distension.      Palpations: Abdomen is soft.      Tenderness: There is no abdominal tenderness.   Musculoskeletal:         General: Normal range of motion.   Skin:     General: Skin is warm and dry.   Neurological:      General: No focal deficit present.      Mental Status: She is alert and